# Patient Record
Sex: FEMALE | Race: WHITE | Employment: UNEMPLOYED | URBAN - METROPOLITAN AREA
[De-identification: names, ages, dates, MRNs, and addresses within clinical notes are randomized per-mention and may not be internally consistent; named-entity substitution may affect disease eponyms.]

---

## 2019-06-14 ENCOUNTER — TELEPHONE (OUTPATIENT)
Dept: NEUROLOGY | Age: 44
End: 2019-06-14

## 2019-06-14 NOTE — TELEPHONE ENCOUNTER
MA contacted pt regarding no show appt 6/14 with Sugar Linder PA-C. Pt stated she forgot and wished to reschedule. Pt was rescheduled for 7/18/18 at the [place] office with SHYLA Turner. Pt confirmed date, time, appt location and was advised to bring insurance card, ID, med list and specialist co-pay to the ov. Reminder letter was mailed to patient.    Electronically signed by Vivi Yang on 6/14/19 at 9:47 AM

## 2019-07-19 ENCOUNTER — TELEPHONE (OUTPATIENT)
Dept: NEUROLOGY | Age: 44
End: 2019-07-19

## 2025-04-06 ENCOUNTER — APPOINTMENT (OUTPATIENT)
Dept: CARDIOLOGY | Facility: HOSPITAL | Age: 50
DRG: 062 | End: 2025-04-06
Payer: COMMERCIAL

## 2025-04-06 ENCOUNTER — APPOINTMENT (OUTPATIENT)
Dept: RADIOLOGY | Facility: HOSPITAL | Age: 50
DRG: 062 | End: 2025-04-06
Payer: COMMERCIAL

## 2025-04-06 ENCOUNTER — HOSPITAL ENCOUNTER (INPATIENT)
Facility: HOSPITAL | Age: 50
End: 2025-04-06
Attending: EMERGENCY MEDICINE | Admitting: INTERNAL MEDICINE
Payer: COMMERCIAL

## 2025-04-06 DIAGNOSIS — I63.9 CEREBROVASCULAR ACCIDENT (CVA), UNSPECIFIED MECHANISM (MULTI): Primary | ICD-10-CM

## 2025-04-06 DIAGNOSIS — I67.848 OTHER CEREBROVASCULAR VASOSPASM AND VASOCONSTRICTION: ICD-10-CM

## 2025-04-06 DIAGNOSIS — I67.89 OTHER CEREBROVASCULAR DISEASE: ICD-10-CM

## 2025-04-06 PROBLEM — I10 HYPERTENSION: Status: ACTIVE | Noted: 2025-04-06

## 2025-04-06 PROBLEM — H57.02 ANISOCORIA: Status: ACTIVE | Noted: 2021-12-08

## 2025-04-06 PROBLEM — Z86.69 HX OF MIGRAINE HEADACHES: Status: ACTIVE | Noted: 2025-03-06

## 2025-04-06 PROBLEM — K21.9 GASTROESOPHAGEAL REFLUX DISEASE: Status: ACTIVE | Noted: 2021-12-08

## 2025-04-06 PROBLEM — E11.9 DIABETES MELLITUS (MULTI): Status: ACTIVE | Noted: 2021-12-08

## 2025-04-06 PROBLEM — J44.9 CHRONIC OBSTRUCTIVE PULMONARY DISEASE (MULTI): Status: ACTIVE | Noted: 2021-12-08

## 2025-04-06 PROBLEM — K58.9 IRRITABLE BOWEL SYNDROME: Status: ACTIVE | Noted: 2025-04-06

## 2025-04-06 PROBLEM — G89.4 CHRONIC PAIN SYNDROME: Status: ACTIVE | Noted: 2021-12-08

## 2025-04-06 PROBLEM — F31.9 BIPOLAR DISORDER: Status: ACTIVE | Noted: 2021-12-08

## 2025-04-06 PROBLEM — G25.81 RESTLESS LEGS: Status: ACTIVE | Noted: 2021-12-08

## 2025-04-06 PROBLEM — R56.9 SEIZURE (MULTI): Status: ACTIVE | Noted: 2025-04-06

## 2025-04-06 PROBLEM — B35.1 ONYCHOMYCOSIS: Status: ACTIVE | Noted: 2017-02-22

## 2025-04-06 PROBLEM — Z96.659 HISTORY OF TOTAL KNEE REPLACEMENT: Status: ACTIVE | Noted: 2025-04-06

## 2025-04-06 LAB
ALBUMIN SERPL BCP-MCNC: 3.5 G/DL (ref 3.4–5)
ALP SERPL-CCNC: 85 U/L (ref 33–110)
ALT SERPL W P-5'-P-CCNC: 6 U/L (ref 7–45)
ANION GAP SERPL CALC-SCNC: 14 MMOL/L (ref 10–20)
AST SERPL W P-5'-P-CCNC: 6 U/L (ref 9–39)
BASOPHILS # BLD AUTO: 0.04 X10*3/UL (ref 0–0.1)
BASOPHILS NFR BLD AUTO: 0.4 %
BILIRUB SERPL-MCNC: 0.4 MG/DL (ref 0–1.2)
BUN SERPL-MCNC: 5 MG/DL (ref 6–23)
CALCIUM SERPL-MCNC: 9.2 MG/DL (ref 8.6–10.3)
CARDIAC TROPONIN I PNL SERPL HS: 3 NG/L (ref 0–13)
CHLORIDE SERPL-SCNC: 103 MMOL/L (ref 98–107)
CO2 SERPL-SCNC: 24 MMOL/L (ref 21–32)
CREAT SERPL-MCNC: 0.62 MG/DL (ref 0.5–1.05)
EGFRCR SERPLBLD CKD-EPI 2021: >90 ML/MIN/1.73M*2
EOSINOPHIL # BLD AUTO: 0.18 X10*3/UL (ref 0–0.7)
EOSINOPHIL NFR BLD AUTO: 1.9 %
ERYTHROCYTE [DISTWIDTH] IN BLOOD BY AUTOMATED COUNT: 14.2 % (ref 11.5–14.5)
GLUCOSE BLD MANUAL STRIP-MCNC: 187 MG/DL (ref 74–99)
GLUCOSE BLD MANUAL STRIP-MCNC: 194 MG/DL (ref 74–99)
GLUCOSE BLD MANUAL STRIP-MCNC: 194 MG/DL (ref 74–99)
GLUCOSE SERPL-MCNC: 200 MG/DL (ref 74–99)
HCT VFR BLD AUTO: 44.4 % (ref 36–46)
HGB BLD-MCNC: 14.7 G/DL (ref 12–16)
IMM GRANULOCYTES # BLD AUTO: 0.07 X10*3/UL (ref 0–0.7)
IMM GRANULOCYTES NFR BLD AUTO: 0.7 % (ref 0–0.9)
INR PPP: 1.1 (ref 0.9–1.1)
LYMPHOCYTES # BLD AUTO: 1.34 X10*3/UL (ref 1.2–4.8)
LYMPHOCYTES NFR BLD AUTO: 14.3 %
MAGNESIUM SERPL-MCNC: 1.7 MG/DL (ref 1.6–2.4)
MCH RBC QN AUTO: 27.9 PG (ref 26–34)
MCHC RBC AUTO-ENTMCNC: 33.1 G/DL (ref 32–36)
MCV RBC AUTO: 84 FL (ref 80–100)
MONOCYTES # BLD AUTO: 0.74 X10*3/UL (ref 0.1–1)
MONOCYTES NFR BLD AUTO: 7.9 %
NEUTROPHILS # BLD AUTO: 6.99 X10*3/UL (ref 1.2–7.7)
NEUTROPHILS NFR BLD AUTO: 74.8 %
NRBC BLD-RTO: 0 /100 WBCS (ref 0–0)
PHOSPHATE SERPL-MCNC: 2.8 MG/DL (ref 2.5–4.9)
PLATELET # BLD AUTO: 235 X10*3/UL (ref 150–450)
POTASSIUM SERPL-SCNC: 3 MMOL/L (ref 3.5–5.3)
PROT SERPL-MCNC: 6 G/DL (ref 6.4–8.2)
PROTHROMBIN TIME: 12.4 SECONDS (ref 9.8–12.4)
RBC # BLD AUTO: 5.27 X10*6/UL (ref 4–5.2)
SODIUM SERPL-SCNC: 138 MMOL/L (ref 136–145)
WBC # BLD AUTO: 9.4 X10*3/UL (ref 4.4–11.3)

## 2025-04-06 PROCEDURE — 85025 COMPLETE CBC W/AUTO DIFF WBC: CPT | Performed by: EMERGENCY MEDICINE

## 2025-04-06 PROCEDURE — 2500000004 HC RX 250 GENERAL PHARMACY W/ HCPCS (ALT 636 FOR OP/ED)

## 2025-04-06 PROCEDURE — 96374 THER/PROPH/DIAG INJ IV PUSH: CPT

## 2025-04-06 PROCEDURE — 85610 PROTHROMBIN TIME: CPT | Performed by: EMERGENCY MEDICINE

## 2025-04-06 PROCEDURE — 70450 CT HEAD/BRAIN W/O DYE: CPT

## 2025-04-06 PROCEDURE — 83735 ASSAY OF MAGNESIUM: CPT | Performed by: EMERGENCY MEDICINE

## 2025-04-06 PROCEDURE — 82947 ASSAY GLUCOSE BLOOD QUANT: CPT

## 2025-04-06 PROCEDURE — 36415 COLL VENOUS BLD VENIPUNCTURE: CPT | Performed by: EMERGENCY MEDICINE

## 2025-04-06 PROCEDURE — 93005 ELECTROCARDIOGRAM TRACING: CPT

## 2025-04-06 PROCEDURE — 2500000001 HC RX 250 WO HCPCS SELF ADMINISTERED DRUGS (ALT 637 FOR MEDICARE OP): Performed by: EMERGENCY MEDICINE

## 2025-04-06 PROCEDURE — 84484 ASSAY OF TROPONIN QUANT: CPT | Performed by: EMERGENCY MEDICINE

## 2025-04-06 PROCEDURE — 99291 CRITICAL CARE FIRST HOUR: CPT | Performed by: INTERNAL MEDICINE

## 2025-04-06 PROCEDURE — 2550000001 HC RX 255 CONTRASTS: Performed by: EMERGENCY MEDICINE

## 2025-04-06 PROCEDURE — 80053 COMPREHEN METABOLIC PANEL: CPT | Performed by: EMERGENCY MEDICINE

## 2025-04-06 PROCEDURE — 2500000004 HC RX 250 GENERAL PHARMACY W/ HCPCS (ALT 636 FOR OP/ED): Performed by: EMERGENCY MEDICINE

## 2025-04-06 PROCEDURE — 2020000001 HC ICU ROOM DAILY

## 2025-04-06 PROCEDURE — 70496 CT ANGIOGRAPHY HEAD: CPT

## 2025-04-06 PROCEDURE — 2500000001 HC RX 250 WO HCPCS SELF ADMINISTERED DRUGS (ALT 637 FOR MEDICARE OP)

## 2025-04-06 PROCEDURE — 2500000002 HC RX 250 W HCPCS SELF ADMINISTERED DRUGS (ALT 637 FOR MEDICARE OP, ALT 636 FOR OP/ED): Performed by: INTERNAL MEDICINE

## 2025-04-06 PROCEDURE — G0425 INPT/ED TELECONSULT30: HCPCS | Performed by: STUDENT IN AN ORGANIZED HEALTH CARE EDUCATION/TRAINING PROGRAM

## 2025-04-06 PROCEDURE — 96361 HYDRATE IV INFUSION ADD-ON: CPT

## 2025-04-06 PROCEDURE — 84100 ASSAY OF PHOSPHORUS: CPT | Performed by: EMERGENCY MEDICINE

## 2025-04-06 PROCEDURE — 71045 X-RAY EXAM CHEST 1 VIEW: CPT

## 2025-04-06 PROCEDURE — 70450 CT HEAD/BRAIN W/O DYE: CPT | Performed by: RADIOLOGY

## 2025-04-06 PROCEDURE — 3E03317 INTRODUCTION OF OTHER THROMBOLYTIC INTO PERIPHERAL VEIN, PERCUTANEOUS APPROACH: ICD-10-PCS | Performed by: EMERGENCY MEDICINE

## 2025-04-06 PROCEDURE — 99291 CRITICAL CARE FIRST HOUR: CPT | Performed by: EMERGENCY MEDICINE

## 2025-04-06 PROCEDURE — 70496 CT ANGIOGRAPHY HEAD: CPT | Performed by: RADIOLOGY

## 2025-04-06 PROCEDURE — 70498 CT ANGIOGRAPHY NECK: CPT | Performed by: RADIOLOGY

## 2025-04-06 PROCEDURE — 99223 1ST HOSP IP/OBS HIGH 75: CPT

## 2025-04-06 PROCEDURE — 2500000002 HC RX 250 W HCPCS SELF ADMINISTERED DRUGS (ALT 637 FOR MEDICARE OP, ALT 636 FOR OP/ED)

## 2025-04-06 PROCEDURE — 2500000001 HC RX 250 WO HCPCS SELF ADMINISTERED DRUGS (ALT 637 FOR MEDICARE OP): Performed by: INTERNAL MEDICINE

## 2025-04-06 RX ORDER — PROMETHAZINE HYDROCHLORIDE 25 MG/1
25 TABLET ORAL EVERY 6 HOURS PRN
Status: ON HOLD | COMMUNITY
Start: 2024-09-09

## 2025-04-06 RX ORDER — OXYCODONE HYDROCHLORIDE 5 MG/1
5 TABLET ORAL ONCE
Status: COMPLETED | OUTPATIENT
Start: 2025-04-06 | End: 2025-04-06

## 2025-04-06 RX ORDER — TOPIRAMATE 100 MG/1
100 TABLET, FILM COATED ORAL 2 TIMES DAILY
Status: ON HOLD | COMMUNITY

## 2025-04-06 RX ORDER — GABAPENTIN 800 MG/1
1 TABLET ORAL 3 TIMES DAILY
Status: ON HOLD | COMMUNITY
Start: 2025-03-25

## 2025-04-06 RX ORDER — INSULIN GLARGINE 100 [IU]/ML
60 INJECTION, SOLUTION SUBCUTANEOUS EVERY 24 HOURS
Status: DISCONTINUED | OUTPATIENT
Start: 2025-04-06 | End: 2025-04-06

## 2025-04-06 RX ORDER — POTASSIUM CHLORIDE 1.5 G/1.58G
40 POWDER, FOR SOLUTION ORAL ONCE
Status: DISCONTINUED | OUTPATIENT
Start: 2025-04-06 | End: 2025-04-06

## 2025-04-06 RX ORDER — LURASIDONE HYDROCHLORIDE 80 MG/1
1 TABLET, FILM COATED ORAL NIGHTLY
Status: ON HOLD | COMMUNITY

## 2025-04-06 RX ORDER — GABAPENTIN 400 MG/1
800 CAPSULE ORAL 3 TIMES DAILY
Status: DISPENSED | OUTPATIENT
Start: 2025-04-06

## 2025-04-06 RX ORDER — INSULIN GLARGINE 100 [IU]/ML
60 INJECTION, SOLUTION SUBCUTANEOUS NIGHTLY
Status: ON HOLD | COMMUNITY

## 2025-04-06 RX ORDER — ROPINIROLE 4 MG/1
1 TABLET, FILM COATED ORAL NIGHTLY
Status: ON HOLD | COMMUNITY

## 2025-04-06 RX ORDER — HYDROXYZINE HYDROCHLORIDE 25 MG/1
50 TABLET, FILM COATED ORAL 3 TIMES DAILY
Status: DISPENSED | OUTPATIENT
Start: 2025-04-06

## 2025-04-06 RX ORDER — LABETALOL HYDROCHLORIDE 5 MG/ML
10 INJECTION, SOLUTION INTRAVENOUS EVERY 10 MIN PRN
Status: ACTIVE | OUTPATIENT
Start: 2025-04-06

## 2025-04-06 RX ORDER — LURASIDONE HYDROCHLORIDE 40 MG/1
80 TABLET, FILM COATED ORAL NIGHTLY
Status: DISCONTINUED | OUTPATIENT
Start: 2025-04-06 | End: 2025-04-06

## 2025-04-06 RX ORDER — HYDROXYZINE HYDROCHLORIDE 50 MG/1
1 TABLET, FILM COATED ORAL 3 TIMES DAILY
Status: ON HOLD | COMMUNITY

## 2025-04-06 RX ORDER — INSULIN GLARGINE 100 [IU]/ML
60 INJECTION, SOLUTION SUBCUTANEOUS NIGHTLY
Status: DISCONTINUED | OUTPATIENT
Start: 2025-04-06 | End: 2025-04-06

## 2025-04-06 RX ORDER — DEXTROSE 50 % IN WATER (D50W) INTRAVENOUS SYRINGE
25
Status: ACTIVE | OUTPATIENT
Start: 2025-04-06

## 2025-04-06 RX ORDER — TOPIRAMATE 50 MG/1
100 TABLET, FILM COATED ORAL 2 TIMES DAILY
Status: DISCONTINUED | OUTPATIENT
Start: 2025-04-06 | End: 2025-04-06

## 2025-04-06 RX ORDER — INSULIN LISPRO 100 [IU]/ML
0-10 INJECTION, SOLUTION INTRAVENOUS; SUBCUTANEOUS
Status: DISPENSED | OUTPATIENT
Start: 2025-04-06

## 2025-04-06 RX ORDER — IPRATROPIUM BROMIDE AND ALBUTEROL SULFATE 2.5; .5 MG/3ML; MG/3ML
3 SOLUTION RESPIRATORY (INHALATION) EVERY 4 HOURS PRN
Status: DISPENSED | OUTPATIENT
Start: 2025-04-06

## 2025-04-06 RX ORDER — ALPRAZOLAM 2 MG/1
1 TABLET ORAL 2 TIMES DAILY PRN
Status: ON HOLD | COMMUNITY

## 2025-04-06 RX ORDER — TRAZODONE HYDROCHLORIDE 100 MG/1
100 TABLET ORAL NIGHTLY
Status: ON HOLD | COMMUNITY

## 2025-04-06 RX ORDER — TEMAZEPAM 30 MG/1
30 CAPSULE ORAL NIGHTLY
Status: ON HOLD | COMMUNITY
Start: 2024-07-18

## 2025-04-06 RX ORDER — TOPIRAMATE 50 MG/1
75 TABLET, FILM COATED ORAL 2 TIMES DAILY
Status: DISPENSED | OUTPATIENT
Start: 2025-04-06

## 2025-04-06 RX ORDER — HYDRALAZINE HYDROCHLORIDE 20 MG/ML
10 INJECTION INTRAMUSCULAR; INTRAVENOUS
Status: ACTIVE | OUTPATIENT
Start: 2025-04-06

## 2025-04-06 RX ORDER — TRAZODONE HYDROCHLORIDE 50 MG/1
100 TABLET ORAL NIGHTLY
Status: DISPENSED | OUTPATIENT
Start: 2025-04-06

## 2025-04-06 RX ORDER — FLUTICASONE FUROATE AND VILANTEROL 200; 25 UG/1; UG/1
1 POWDER RESPIRATORY (INHALATION) DAILY
Status: ON HOLD | COMMUNITY

## 2025-04-06 RX ORDER — BUTALBITAL, ACETAMINOPHEN AND CAFFEINE 50; 325; 40 MG/1; MG/1; MG/1
1 TABLET ORAL EVERY 8 HOURS PRN
Status: DISPENSED | OUTPATIENT
Start: 2025-04-06 | End: 2025-04-08

## 2025-04-06 RX ORDER — ROPINIROLE 1 MG/1
4 TABLET, FILM COATED ORAL NIGHTLY
Status: DISPENSED | OUTPATIENT
Start: 2025-04-06

## 2025-04-06 RX ORDER — ATORVASTATIN CALCIUM 40 MG/1
40 TABLET, FILM COATED ORAL NIGHTLY
Status: DISPENSED | OUTPATIENT
Start: 2025-04-06

## 2025-04-06 RX ORDER — ALPRAZOLAM 0.5 MG/1
2 TABLET ORAL 2 TIMES DAILY PRN
Status: DISPENSED | OUTPATIENT
Start: 2025-04-06

## 2025-04-06 RX ORDER — ALBUTEROL SULFATE 90 UG/1
2 INHALANT RESPIRATORY (INHALATION) EVERY 4 HOURS PRN
Status: DISCONTINUED | OUTPATIENT
Start: 2025-04-06 | End: 2025-04-06

## 2025-04-06 RX ORDER — DEXTROSE 50 % IN WATER (D50W) INTRAVENOUS SYRINGE
12.5
Status: ACTIVE | OUTPATIENT
Start: 2025-04-06

## 2025-04-06 RX ORDER — FLUTICASONE FUROATE AND VILANTEROL 200; 25 UG/1; UG/1
1 POWDER RESPIRATORY (INHALATION) DAILY
Status: DISPENSED | OUTPATIENT
Start: 2025-04-06

## 2025-04-06 RX ORDER — ALBUTEROL SULFATE 90 UG/1
2 INHALANT RESPIRATORY (INHALATION) EVERY 4 HOURS PRN
Status: ON HOLD | COMMUNITY

## 2025-04-06 RX ORDER — POTASSIUM CHLORIDE 20 MEQ/1
40 TABLET, EXTENDED RELEASE ORAL ONCE
Status: COMPLETED | OUTPATIENT
Start: 2025-04-06 | End: 2025-04-06

## 2025-04-06 RX ADMIN — GABAPENTIN 800 MG: 400 CAPSULE ORAL at 17:24

## 2025-04-06 RX ADMIN — GABAPENTIN 800 MG: 400 CAPSULE ORAL at 21:07

## 2025-04-06 RX ADMIN — INSULIN LISPRO 2 UNITS: 100 INJECTION, SOLUTION INTRAVENOUS; SUBCUTANEOUS at 17:44

## 2025-04-06 RX ADMIN — ALPRAZOLAM 2 MG: 0.5 TABLET ORAL at 21:12

## 2025-04-06 RX ADMIN — Medication 25 MG: at 12:00

## 2025-04-06 RX ADMIN — HYDROXYZINE HYDROCHLORIDE 50 MG: 25 TABLET, FILM COATED ORAL at 17:24

## 2025-04-06 RX ADMIN — BUTALBITAL, ACETAMINOPHEN, AND CAFFEINE 1 TABLET: 325; 50; 40 TABLET ORAL at 17:23

## 2025-04-06 RX ADMIN — INSULIN LISPRO 2 UNITS: 100 INJECTION, SOLUTION INTRAVENOUS; SUBCUTANEOUS at 21:02

## 2025-04-06 RX ADMIN — ROPINIROLE HYDROCHLORIDE 4 MG: 1 TABLET, FILM COATED ORAL at 21:07

## 2025-04-06 RX ADMIN — IPRATROPIUM BROMIDE AND ALBUTEROL SULFATE 3 ML: 2.5; .5 SOLUTION RESPIRATORY (INHALATION) at 17:24

## 2025-04-06 RX ADMIN — TRAZODONE HYDROCHLORIDE 100 MG: 50 TABLET ORAL at 21:05

## 2025-04-06 RX ADMIN — POTASSIUM CHLORIDE 40 MEQ: 1500 TABLET, EXTENDED RELEASE ORAL at 17:24

## 2025-04-06 RX ADMIN — ATORVASTATIN CALCIUM 40 MG: 40 TABLET, FILM COATED ORAL at 21:07

## 2025-04-06 RX ADMIN — OXYCODONE HYDROCHLORIDE 5 MG: 5 TABLET ORAL at 13:01

## 2025-04-06 RX ADMIN — TOPIRAMATE 75 MG: 50 TABLET, FILM COATED ORAL at 21:07

## 2025-04-06 RX ADMIN — SODIUM CHLORIDE 1000 ML: 9 INJECTION, SOLUTION INTRAVENOUS at 12:21

## 2025-04-06 RX ADMIN — IOHEXOL 90 ML: 350 INJECTION, SOLUTION INTRAVENOUS at 11:42

## 2025-04-06 RX ADMIN — HYDROXYZINE HYDROCHLORIDE 50 MG: 25 TABLET, FILM COATED ORAL at 21:06

## 2025-04-06 ASSESSMENT — LIFESTYLE VARIABLES
EVER HAD A DRINK FIRST THING IN THE MORNING TO STEADY YOUR NERVES TO GET RID OF A HANGOVER: NO
EVER FELT BAD OR GUILTY ABOUT YOUR DRINKING: NO
HAVE YOU EVER FELT YOU SHOULD CUT DOWN ON YOUR DRINKING: NO
HAVE PEOPLE ANNOYED YOU BY CRITICIZING YOUR DRINKING: NO
TOTAL SCORE: 0

## 2025-04-06 ASSESSMENT — COGNITIVE AND FUNCTIONAL STATUS - GENERAL
HELP NEEDED FOR BATHING: A LITTLE
DAILY ACTIVITIY SCORE: 20
DRESSING REGULAR UPPER BODY CLOTHING: A LITTLE
CLIMB 3 TO 5 STEPS WITH RAILING: A LOT
TOILETING: A LITTLE
MOBILITY SCORE: 14
DRESSING REGULAR LOWER BODY CLOTHING: A LITTLE
WALKING IN HOSPITAL ROOM: A LOT
MOVING TO AND FROM BED TO CHAIR: A LOT
TURNING FROM BACK TO SIDE WHILE IN FLAT BAD: A LITTLE
MOVING FROM LYING ON BACK TO SITTING ON SIDE OF FLAT BED WITH BEDRAILS: A LITTLE
STANDING UP FROM CHAIR USING ARMS: A LOT

## 2025-04-06 ASSESSMENT — ENCOUNTER SYMPTOMS
ACTIVITY CHANGE: 1
NAUSEA: 0
NUMBNESS: 1
WOUND: 0
CHILLS: 0
HEMATURIA: 0
JOINT SWELLING: 0
COUGH: 0
CHEST TIGHTNESS: 0
SHORTNESS OF BREATH: 0
FLANK PAIN: 0
BACK PAIN: 0
FACIAL ASYMMETRY: 1
HEADACHES: 1
FATIGUE: 0
WEAKNESS: 1
CONSTIPATION: 0
PALPITATIONS: 0
FEVER: 0
WHEEZING: 0
VOMITING: 0
DIARRHEA: 0
TREMORS: 0
ABDOMINAL PAIN: 0

## 2025-04-06 ASSESSMENT — PAIN - FUNCTIONAL ASSESSMENT
PAIN_FUNCTIONAL_ASSESSMENT: 0-10

## 2025-04-06 ASSESSMENT — PAIN DESCRIPTION - LOCATION: LOCATION: HEAD

## 2025-04-06 ASSESSMENT — COLUMBIA-SUICIDE SEVERITY RATING SCALE - C-SSRS
2. HAVE YOU ACTUALLY HAD ANY THOUGHTS OF KILLING YOURSELF?: NO
1. IN THE PAST MONTH, HAVE YOU WISHED YOU WERE DEAD OR WISHED YOU COULD GO TO SLEEP AND NOT WAKE UP?: NO
6. HAVE YOU EVER DONE ANYTHING, STARTED TO DO ANYTHING, OR PREPARED TO DO ANYTHING TO END YOUR LIFE?: NO

## 2025-04-06 ASSESSMENT — PAIN SCALES - GENERAL
PAINLEVEL_OUTOF10: 8
PAINLEVEL_OUTOF10: 10 - WORST POSSIBLE PAIN
PAINLEVEL_OUTOF10: 0 - NO PAIN
PAINLEVEL_OUTOF10: 8
PAINLEVEL_OUTOF10: 8

## 2025-04-06 ASSESSMENT — PAIN DESCRIPTION - DESCRIPTORS: DESCRIPTORS: STABBING;SQUEEZING

## 2025-04-06 ASSESSMENT — PAIN DESCRIPTION - PAIN TYPE: TYPE: ACUTE PAIN

## 2025-04-06 NOTE — CONSULTS
"Consults  Virtual Visit start time: 12:00    History Of Present Illness:  Historian: Patient and ED Provider   Maribeth Nam is a 49 y.o. female presenting with L facial droop and R sided weakness.  Last known well: 11am   Had stroke symptoms resolved at time of presentation: No   Current antiplatelet/anticoagulant use: None    Pt with hx of reported grand-mal seizures on topiramate, anxiety.     Pt states she was at [employer] when she noticed her L face became droopy. EMS called. No vision / hearing changes. Reports head pressure. Her R side feels weak but she notes it's more of her R knee discomfort, chronic, since her R knee surgery >1 year ago with tingling sensation. Came to ED for eval.    In the ED, /97, initial NIHSS by ED provider 9. Received TNK for the sx already. On virtual eval 4 which was done shortly after TNK. Pt subjectively reports she does not feel sx improving since TNK (perhaps worsening).     Prior Functional Status (Modified [place] Scale):  0 The patient has no residual symptoms.    Stroke Risk Factors:  Diabetes Mellitus and Sleep Apnea    Last Recorded Vitals:  Blood pressure 115/76, pulse 80, temperature 36.8 °C (98.3 °F), temperature source Temporal, resp. rate 18, height 1.626 m (5' 4\"), weight 119 kg (262 lb 5.6 oz), SpO2 94%.    NIHSS   NIHSS    1a  Level of consciousness: 0=alert; keenly responsive   1b. LOC questions:  0=Performs both tasks correctly   1c. LOC commands: 0=Performs both tasks correctly   2.  Best Gaze: 0=normal   3. Visual: 0=No visual loss   4. Facial Palsy: 2=Partial paralysis (total or near total paralysis of the lower face)   5a. Motor Left Arm: 0=No drift, limb holds 90 (or 45) degrees for full 10 seconds   5b.  Motor Right Arm: 1=Drift, limb holds 90 (or 45) degrees but drifts down before full 10 seconds: does not hit bed   6a. Motor Left Le=No drift, limb holds 90 (or 45) degrees for full 10 seconds   6b  Motor Right Le=Drift, limb holds 90 (or " 45) degrees but drifts down before full 10 seconds: does not hit bed   7. Limb Ataxia: 0=Absent   8.  Sensory: 0=Normal; no sensory loss   9. Best Language:  0=No aphasia, normal   10. Dysarthria: 0=Normal   11. Extinction and Inattention: 0=No abnormality          Total:   4     No truncal ataxia. She was able to sit up on the edge of bed without much support. She did report lightheadedness.        Relevant Results:  LABS:  Glucose   Date Value Ref Range Status   04/06/2025 200 (H) 74 - 99 mg/dL Final     INR   Date Value Ref Range Status   04/06/2025 1.1 0.9 - 1.1 Final      Results for orders placed or performed during the hospital encounter of 04/06/25 (from the past 24 hours)   POCT GLUCOSE   Result Value Ref Range    POCT Glucose 194 (H) 74 - 99 mg/dL   Troponin I, High Sensitivity   Result Value Ref Range    Troponin I, High Sensitivity 3 0 - 13 ng/L   Comprehensive metabolic panel   Result Value Ref Range    Glucose 200 (H) 74 - 99 mg/dL    Sodium 138 136 - 145 mmol/L    Potassium 3.0 (L) 3.5 - 5.3 mmol/L    Chloride 103 98 - 107 mmol/L    Bicarbonate 24 21 - 32 mmol/L    Anion Gap 14 10 - 20 mmol/L    Urea Nitrogen 5 (L) 6 - 23 mg/dL    Creatinine 0.62 0.50 - 1.05 mg/dL    eGFR >90 >60 mL/min/1.73m*2    Calcium 9.2 8.6 - 10.3 mg/dL    Albumin 3.5 3.4 - 5.0 g/dL    Alkaline Phosphatase 85 33 - 110 U/L    Total Protein 6.0 (L) 6.4 - 8.2 g/dL    AST 6 (L) 9 - 39 U/L    Bilirubin, Total 0.4 0.0 - 1.2 mg/dL    ALT 6 (L) 7 - 45 U/L   CBC and Auto Differential   Result Value Ref Range    WBC 9.4 4.4 - 11.3 x10*3/uL    nRBC 0.0 0.0 - 0.0 /100 WBCs    RBC 5.27 (H) 4.00 - 5.20 x10*6/uL    Hemoglobin 14.7 12.0 - 16.0 g/dL    Hematocrit 44.4 36.0 - 46.0 %    MCV 84 80 - 100 fL    MCH 27.9 26.0 - 34.0 pg    MCHC 33.1 32.0 - 36.0 g/dL    RDW 14.2 11.5 - 14.5 %    Platelets 235 150 - 450 x10*3/uL    Neutrophils % 74.8 40.0 - 80.0 %    Immature Granulocytes %, Automated 0.7 0.0 - 0.9 %    Lymphocytes % 14.3 13.0 - 44.0 %     Monocytes % 7.9 2.0 - 10.0 %    Eosinophils % 1.9 0.0 - 6.0 %    Basophils % 0.4 0.0 - 2.0 %    Neutrophils Absolute 6.99 1.20 - 7.70 x10*3/uL    Immature Granulocytes Absolute, Automated 0.07 0.00 - 0.70 x10*3/uL    Lymphocytes Absolute 1.34 1.20 - 4.80 x10*3/uL    Monocytes Absolute 0.74 0.10 - 1.00 x10*3/uL    Eosinophils Absolute 0.18 0.00 - 0.70 x10*3/uL    Basophils Absolute 0.04 0.00 - 0.10 x10*3/uL   Magnesium   Result Value Ref Range    Magnesium 1.70 1.60 - 2.40 mg/dL   Protime-INR   Result Value Ref Range    Protime 12.4 9.8 - 12.4 seconds    INR 1.1 0.9 - 1.1   Phosphorus   Result Value Ref Range    Phosphorus 2.8 2.5 - 4.9 mg/dL        CT Head Imaging:  CTH imaging personally reviewed, showed no acute ischemic / hemorrhagic changes     CTA Head and Neck Imaging:  CTA head and neck imaging personally reviewed, no large vessel occlusion or severe stenosis seen    CT Perfusion Imaging:  N/a     Assessment:  Supect Acute Ischemic Stroke    IV Thrombolysis IV Thrombolysis Checklist        IV Thrombolysis Given: Yes Thrombolysis Administration: administration time 1200pm. Patient meets inclusion and exclusion criteria with expected benefits exceeding the risks of IV thrombolysis therapy or withholding therapy. Patient/ family understand potential risks & benefits and consent to IV Thrombolysis. Discussed the diagnosis of suspected ischemic stroke and options for treatment, including alternative treatments. For patients meeting inclusion and exclusion criteria, the expected benefits exceed the risks of IV thrombolysis therapy or withholding therapy. The main risk of IV thrombolysis therapy is bleeding into the brain or body that may require blood transfusions or lead to death. In clinical studies, the rate of death was not higher in patients who received IV thrombolysis compared to those who did not. Other risks include allergic reactions, and with any procedure there is always the possibility of an  unexpected complication.  Patient is <18 - refer  [facility] for Emergency Management of pediatric patients with Acute suspected Stroke & the Pediatric IV Thrombolysis Consent. Consent is completed on a paper document and if criteria is met/ benefit outweigh the risk the thrombolytic Alteplase should be given.  :99}Were there delays to thrombolysis administration?: no          Patient is a candidate for thrombectomy:  yes/no: No; contraindication reason: No evidence of proximal occlusion    Additional Recommendations:  New facial droop. R weakness may have improved though subjectively pt feels worse.   Recommend stroke eval.    MRI Brain w/o contrast stroke protocol or repeat CTH 24 hours after initial CTH if unable to get MRI.  CTA head and neck or MRA head and neck to evaluate for cerebral vasculature   EKG, troponin.  TTE w/ bubble study.  Please obtain extended cardiac rhythm monitoring with Holter to rule out paroxysmal A fib.  Lipid panel, A1c.  Utox.  Hold off all antiplatelets and anticoagulant medications for 24 hrs after thrombolysis. Repeat CT Head 24 hrs after TNK administration. If CTH negative for hemorrhagic conversion, consider starting Aspirin 81 mg.  Hold apt 24h, aspirin after   Lipid Goals: education on healthy diet and statin therapy to maintain or achieve goal LDL-cholesterol < 70mg. Atorvastatin 40mg..  Blood pressure goals: avoid hypotension SBP <100 that could worsen cerebral perfusion. Ischemic stroke post-thrombolysis- BP < 180/105 mmHg for 24hr..  Glucose Goals: early treatment of hyperglycemia to goal glucose 140-180 mg/dl with long-term goal A1c < 7%.  NPO until nurse bedside swallow assessment.  Consider focused stroke order set.  Smoking Cessation and Education.  Assessment for Rehabilitation needs.  Patient and family education on signs and symptoms of stroke, calling 911, healthy strategies for stroke prevention.        Disposition:  Patient will remain at referring facility for further  evaluation and management.    Virtual or Telephone Consent    An interactive audio and video telecommunication system which permits real time communications between the patient (at the originating site) and provider (at the distant site) was utilized to provide this telehealth service.   Verbal consent was requested and obtained from Maribeth Nam on this date, 04/06/25 for a telehealth visit.      Total time spent: 30min     Telestroke is covered in shift work. If there are further Neurological questions or concerns please contact your regional neurologist on call during daytime hours or contact the transfer center with an ADT20 order.    Jazmyn Burks MD

## 2025-04-06 NOTE — ED TRIAGE NOTES
"Pt arrived by squad from [facility]. Pt experienced facial drooping, light headedness, and \"feeling funny\" around 1100. Pt also exhibited slurred speech upon arrival. Bgl 194  "

## 2025-04-06 NOTE — CONSULTS
"History Of Present Illness  Maribeth Nam is a 49 y.o. female right handed admitted to [facility] on 4/6/25 presenting with L face numb/weakness and RLE numbness. Neurology consulted for above.    Last known well: 11am  Had stroke symptoms resolved at time of presentation: No    Inpatient consult to Neurology  Consult performed by: Iraj Zimmerman MD  Consult ordered by: Isidro Armendariz PA-C      Listed history of COPD, diabetes (not anymore), migraines, epilepsy, RLS, obesity.    Patient seen this afternoon in the ER, no family members around.  Patient able to give her own history.  Patient lives with son and boyfriend.    Patient states she was in her usual state of health.  She was at [employer] this morning, and while at service about 11 AM, she noted left face paresthesias, and left facial drooping.  Shortly after, she noted numbness in right leg and right thigh.  She tells me she also has tingling in LLE that is new today.  Also some numbness in the right face \"but that is of a minor concern\".  EMS was called, and she was brought to [facility] ED for evaluation.  Initial vital signs documented blood pressure 137/97, heart rate 88, afebrile.  In the ER, she reported left facial droop, difficulty speaking, and weakness in the right upper and lower extremities.  She reported TIAs in the past.  NIH stroke scale score was noted to be 9.  Head CT without contrast done did not show any acute findings.  CTA brain/neck done did not report any hemodynamically significant stenosis or occlusion.  Stroke neurology was consulted, IV TNK was already given (1200h) by the time patient was seen.  Patient admitted to the hospital, neurology consulted.    She states she has had a febrile illness with temperature up to 104 Fahrenheit about 4 days prior to admission.  She did not get checked out.  She was taking over-the-counter medications.  Her temperature limited by the third day.    When I saw patient, she states she still has the same " "symptoms.  Nurse actually told me she was complaining of worsening headache, and was on the way to getting repeat head CT done stat ordered by the ER physician.    On further questioning, patient also has history of epilepsy.  She states this started at age 14, and has been happening on and off over the years, last one was sometime in 2024.  She is on topiramate 50 mg twice daily for this right now.  She tells me she had been on phenobarbital in the past, Depakote (stopped because of weight gain), and Dilantin (toxicity), ? Keppra (? Weight gain).  Patient states her previous neurologist had her on as high as \"topiramate 600 mg in the morning and 800 mg in the evening\" in the past.    She also states she has had \"migraines\".  Starts bifrontal, goes to the back of the head, \"more than throbbing pain\".  She has light sensitivity with it, nausea/vomiting.  She reports being on Nurtec started recently (did not help).  She is on topiramate for this.    She also tells me she has bilateral hand shaking, worse in the last 2 to 3 years.  This has been going on since shortly after she started having seizures (teenagers).  She tells me her previous neurologist have not given her any diagnosis for this and have not tried anything.    She has allergy to aspirin (hives).  She is not on clopidogrel.  She is on topiramate 50 mg twice daily, having just been restarted recently in the last 2 months or so.    Of note, patient has had at least 2 other hospitalizations it appears at other [facility] since January 2025.  She tells me that \"the local doctors are idiots\".  She tells me that [facility], and has only been there a few weeks, she is unable to tell me where the [facility] is, but presumably it is around here, and so she was brought here instead.  It appears she has not been to this facility in the recent past, nor any  facility.  Patient reports 1 time her symptoms were " "attributed to migraine, and 1 time it was attributed to seizures.  She was not too happy with those diagnoses.    Patient states she has had her eyes checked recently--no problems inside the eye to contribute to headaches.    She smokes about 1 pack a day.  She tells me that she was smoking up to 7 packs a day in 2016 when her boyfriend .  She denies alcohol drinking.  She denies street drug use.    On review of [facility] records:    Prior admission elsewhere 2025 to 2025 for acute left-sided weakness, associated with right-sided headache, tingling left face arm and thigh as well as weakness of the left arm and leg, with flashing lights in the peripheral bilaterally similar to previous migraines.  Found to be hypertensive in the ER with /80.  NIH 7.  Head CT, CTA with no abnormal findings.  Echo with normal EF, no PFO.  MRI brain done 2025 did not show acute intracranial findings.  Suspicious for intracranial hypertension, she was supposed to have been on topiramate for migraine and seizure which she was not taking.  It appears that symptoms were attributed to likely migraine with aura with some functional overlay as well.  Topiramate was restarted up to 50 mg twice daily, and Neurontin 800 mg 3 times daily was continued.  Aspirin and Plavix were recommended, however patient has listed allergy to aspirin, and so only clopidogrel was started.  She noted left leg weakness as well.  Testing was done, neurology consulted, suggested there could be \"FND component\".  3/5/2025 head CT without contrast reported no acute intracranial pathology.  3/5/2025 CTA head/neck done reported no stenosis or occlusion in the head or neck.    Prior admission elsewhere 3/5/25 to 3/6/2025 due to left face/arm/leg tingling that began while she was trying to fall asleep.    Review of Systems   Constitutional:  Negative for appetite change, chills and fever.   HENT:  Negative for ear pain and nosebleeds.    Eyes:  " Negative for discharge and itching.   Respiratory:  Negative for choking and chest tightness.    Cardiovascular:  Negative for chest pain and palpitations.   Gastrointestinal:  Negative for abdominal distention, abdominal pain and nausea.   Endocrine: Negative for cold intolerance and heat intolerance.   Genitourinary:  Negative for difficulty urinating and dysuria.   Musculoskeletal:  Negative for gait problem and myalgias.   Neurological:  Negative for dizziness.    Past Medical History  Past Medical History:   Diagnosis Date    Anxiety disorder, unspecified     Anxiety    Chronic obstructive pulmonary disease, unspecified     Chronic obstructive pulmonary disease    Obstructive sleep apnea (adult) (pediatric)     Obstructive sleep apnea    Other conditions influencing health status     Eye Trauma To The Left Eye    Personal history of other diseases of the musculoskeletal system and connective tissue     History of low back pain    Personal history of other diseases of the nervous system and sense organs     History of migraine headaches    Personal history of other diseases of the respiratory system     Personal history of asthma       Surgical History  Past Surgical History:   Procedure Laterality Date    CHOLECYSTECTOMY  07/15/2013    Cholecystectomy    HERNIA REPAIR  07/15/2013    Hernia Repair    HYSTERECTOMY  07/15/2013    Hysterectomy    OTHER SURGICAL HISTORY  07/15/2013    Foot Surgery Left       Social History  Social History     Tobacco Use    Smoking status: Every Day     Current packs/day: 1.00     Types: Cigarettes       Home Meds  (Not in a hospital admission)      Allergies  Doxycycline, Fish containing products, Ibuprofen, Meperidine, Sulfa (sulfonamide antibiotics), Aspirin, and Phenothiazines    Current Meds  Scheduled medications  atorvastatin, 40 mg, oral, Nightly  fluticasone furoate-vilanteroL, 1 puff, inhalation, Daily  gabapentin, 800 mg, oral, TID  hydrOXYzine HCL, 50 mg, oral,  "TID  insulin lispro, 0-10 Units, subcutaneous, Before meals & nightly  perflutren protein A microsphere, 0.5 mL, intravenous, Once in imaging  potassium chloride, 40 mEq, oral, Once  rOPINIRole, 4 mg, oral, Nightly  topiramate, 75 mg, oral, BID  traZODone, 100 mg, oral, Nightly      Continuous medications     PRN medications  PRN medications: albuterol, ALPRAZolam, dextrose, dextrose, glucagon, glucagon, hydrALAZINE, labetaloL    Last Recorded Vitals  Blood pressure 128/77, pulse 82, temperature 36.8 °C (98.3 °F), temperature source Temporal, resp. rate 14, height 1.626 m (5' 4\"), weight 119 kg (262 lb 5.6 oz), SpO2 96%.    Awake, alert, oriented x3, in no distress  Well-nourished, in bed  No leg edema    Patient was seen trying to scoot herself up the bed independently    Mental status exam as above, conversant   Fair fund of knowledge  Recent/remote memory fair  Fair attention span  Pupils round reactive to light, 3-4 mm on R, irregular large pupil on L (per pt post-traumatic--has baseline poor vision on L eye from prior trauma at 4yo), (-) RAPD   Fundoscopic examination was attempted but fundus was not visualized bilaterally   Full EOMs intact, no nystagmus, no ptosis   V1 to V3 sensation is intact, except dec V1 vib sensation on R (non-physiologic)  No facial droop   Hearing grossly intact   No dysarthria  Good shoulder shrug bilaterally   Tongue is midline     (+) action tremor B hand, coarse    Motor strength is at least antigravity on all extremities with some ? Give-way weakness on R shoulder abduction, tone/bulk normal   Reflexes trace on all 4 extremities except R knee not checked (per pt post-op 1 year, deferred testing), downgoing toes bilaterally   Sensation is intact to light touch, vibration on all 4 extremities otherwise  Finger to nose test intact bilaterally   I did not have her stand or walk    Supple neck           NIH Stroke Scale  1A. Level of Consciousness: Alert, Keenly Responsive  1B. Ask " Month and Age: Both Questions Right  1C. Blink Eyes & Squeeze Hands: Performs Both Tasks  2. Best Gaze: Normal  3. Visual: No Visual Loss  4. Facial Palsy: Minor Paralysis  5A. Motor - Left Arm: No Drift  5B. Motor - Right Arm: Drift  6A. Motor - Left Leg: No Drift  6B. Motor - Right Leg: Some Effort Against Gravity  7. Limb Ataxia: Present in One Limb  8. Sensory Loss: Mild-to-Moderate Sensory Loss  9. Best Language: No Aphasia  10. Dysarthria: Mild-to-Moderate Dysarthria  11. Extinction and Inattention: No Abnormality  NIH Stroke Scale: 7    Relevant Results  I have personally reviewed the following:    Labs  Results for orders placed or performed during the hospital encounter of 04/06/25 (from the past 24 hours)   POCT GLUCOSE   Result Value Ref Range    POCT Glucose 194 (H) 74 - 99 mg/dL   Troponin I, High Sensitivity   Result Value Ref Range    Troponin I, High Sensitivity 3 0 - 13 ng/L   Comprehensive metabolic panel   Result Value Ref Range    Glucose 200 (H) 74 - 99 mg/dL    Sodium 138 136 - 145 mmol/L    Potassium 3.0 (L) 3.5 - 5.3 mmol/L    Chloride 103 98 - 107 mmol/L    Bicarbonate 24 21 - 32 mmol/L    Anion Gap 14 10 - 20 mmol/L    Urea Nitrogen 5 (L) 6 - 23 mg/dL    Creatinine 0.62 0.50 - 1.05 mg/dL    eGFR >90 >60 mL/min/1.73m*2    Calcium 9.2 8.6 - 10.3 mg/dL    Albumin 3.5 3.4 - 5.0 g/dL    Alkaline Phosphatase 85 33 - 110 U/L    Total Protein 6.0 (L) 6.4 - 8.2 g/dL    AST 6 (L) 9 - 39 U/L    Bilirubin, Total 0.4 0.0 - 1.2 mg/dL    ALT 6 (L) 7 - 45 U/L   CBC and Auto Differential   Result Value Ref Range    WBC 9.4 4.4 - 11.3 x10*3/uL    nRBC 0.0 0.0 - 0.0 /100 WBCs    RBC 5.27 (H) 4.00 - 5.20 x10*6/uL    Hemoglobin 14.7 12.0 - 16.0 g/dL    Hematocrit 44.4 36.0 - 46.0 %    MCV 84 80 - 100 fL    MCH 27.9 26.0 - 34.0 pg    MCHC 33.1 32.0 - 36.0 g/dL    RDW 14.2 11.5 - 14.5 %    Platelets 235 150 - 450 x10*3/uL    Neutrophils % 74.8 40.0 - 80.0 %    Immature Granulocytes %, Automated 0.7 0.0 - 0.9 %     Lymphocytes % 14.3 13.0 - 44.0 %    Monocytes % 7.9 2.0 - 10.0 %    Eosinophils % 1.9 0.0 - 6.0 %    Basophils % 0.4 0.0 - 2.0 %    Neutrophils Absolute 6.99 1.20 - 7.70 x10*3/uL    Immature Granulocytes Absolute, Automated 0.07 0.00 - 0.70 x10*3/uL    Lymphocytes Absolute 1.34 1.20 - 4.80 x10*3/uL    Monocytes Absolute 0.74 0.10 - 1.00 x10*3/uL    Eosinophils Absolute 0.18 0.00 - 0.70 x10*3/uL    Basophils Absolute 0.04 0.00 - 0.10 x10*3/uL   Magnesium   Result Value Ref Range    Magnesium 1.70 1.60 - 2.40 mg/dL   Protime-INR   Result Value Ref Range    Protime 12.4 9.8 - 12.4 seconds    INR 1.1 0.9 - 1.1   Phosphorus   Result Value Ref Range    Phosphorus 2.8 2.5 - 4.9 mg/dL       Imaging results  MR brain wo IV contrast    Result Date: 1/30/2025  * * *Final Report* * * DATE OF EXAM: Jan 30 2025 12:15PM   [facility]   [id number]  -  MRI BRAIN WO IVCON  / ACCESSION #  [id number] PROCEDURE REASON: Neuro deficit, acute, stroke suspected      * * * * Physician Interpretation * * * *  EXAMINATION: MRI BRAIN WO IVCON CLINICAL HISTORY: Left-sided numbness, slurred speech, and facial droop starting yesterday. TECHNIQUE: Routine noncontrast MRI protocol including diffusion images. MQ: MRBWO_2 COMPARISON: CT brain 01/29/2025 RESULT: Acute Change: There is no evidence of restricted diffusion to suggest an acute infarct. Hemorrhage: No evidence of prior parenchymal hemorrhage on the susceptibility weighted images. Mass Lesion/ Mass Effect: No evidence of an intracranial mass or extra-axial fluid collection.  No significant mass effect. Chronic Change: The white matter is within normal limits of signal intensity for age. Parenchyma: No significant volume loss for age.  The brain parenchyma is otherwise within normal limits of signal intensity and morphology. Ventricles: Normal caliber and morphology. Skull Base: Flattening of the pituitary.  Hypothalamic region is unremarkable.   Craniocervical junction is normal. No significant marrow  replacement process. Vasculature: Major intracranial arterial structures, and dural venous sinuses show typical flow void, suggesting patency by spin echo criteria. Other: The visualized paranasal sinuses and mastoid air cells are clear.   Engorgement of the bilateral optic nerve sheaths.  The extracranial soft tissues are unremarkable. Impression: No acute intracranial findings. Constellation of findings suspicious for intracranial hypertension, including flattening of the pituitary and engorgement of the bilateral optic nerve sheaths. : PSCB   Transcribe Date/Time: Jan 30 2025 12:16P Dictated by : BRYCE KENNEDY MD This examination was interpreted and the report reviewed and electronically signed by: BRYCE KENNEDY MD on Jan 30 2025 12:23PM  EST    CT head wo IV contrast    Result Date: 4/6/2025  Interpreted By:  Beti Van, STUDY: CT HEAD WO IV CONTRAST;  4/6/2025 2:45 pm   INDICATION: Signs/Symptoms:headache after TPA.     COMPARISON: 04/06/2025 at 11:34 a.m.   ACCESSION NUMBER(S): [id number]   ORDERING CLINICIAN: MARTY LEJEUNE   TECHNIQUE: Noncontrast axial CT scan of head was performed. Angled reformats in brain and bone windows and coronal and sagittal reformats in brain window were generated.   FINDINGS: CSF Spaces: The ventricles, sulci and basal cisterns are within normal limits. There is no extraaxial fluid collection.   Parenchyma:  The grey-white differentiation is intact. There is no mass effect or midline shift.  There is no intracranial hemorrhage.   Calvarium: The calvarium is unremarkable.   Paranasal sinuses and mastoids: Retention cyst is noted upper portion of the left maxillary sinus. Patchy mucoperiosteal thickening is present in ethmoid air cells bilaterally. Mastoid air cells appear clear.       No evidence of acute cortical infarct or intracranial hemorrhage.   No evidence of intracranial hemorrhage or displaced skull fracture.   MACRO: None.   Signed by: Beti  Pretorius 4/6/2025 3:14 PM Dictation workstation:   [id number]    CT brain attack head wo IV contrast    Result Date: 4/6/2025  Interpreted By:  Juliano Hamilton, STUDY: CT BRAIN ATTACK HEAD WO IV CONTRAST;  4/6/2025 11:35 am   INDICATION: Signs/Symptoms:cva.     COMPARISON: None.   ACCESSION NUMBER(S): [id number]   ORDERING CLINICIAN: MARTY LEJEUNE   TECHNIQUE: Noncontrast axial CT scan of head was performed. Angled reformats in brain and bone windows were generated. The images were reviewed in bone, brain, blood and soft tissue windows.   FINDINGS: CSF Spaces: The ventricles, sulci and basal cisterns are within normal limits. There is no extraaxial fluid collection. Partially empty sella appearance   Parenchyma:  The grey-white differentiation is intact. There is no mass effect or midline shift.  There is no intracranial hemorrhage.   Calvarium: The calvarium is unremarkable.   Paranasal sinuses and mastoids: Minimal mucosal thickening paranasal sinuses.       No evidence of acute cortical infarct or intracranial hemorrhage.   Minimal mucosal thickening paranasal sinuses.   MACRO: Juliano Hamilton discussed the significance and urgency of this critical finding by secure chat with  MARTY LEJEUNE on 4/6/2025 at 11:40 am.  (**-RCF-**) Findings:  See findings.     Signed by: Juliano Hamilton 4/6/2025 11:40 AM Dictation workstation:   [id number]    CT head wo IV contrast    Result Date: 3/5/2025  * * *Final Report* * * DATE OF EXAM: Mar  5 2025  2:45PM   [facility]   0502  -  CT BRAIN ATTACK WO IVCON  / ACCESSION #  [id number] PROCEDURE REASON: Focal neuro deficit, new, fixed, or worsening, < 4.5 hours, stroke suspected      * * * * Physician Interpretation * * * *  EXAMINATION: CT BRAIN ATTACK WO IVCON CLINICAL HISTORY: Right facial droop, left-sided weakness. Brain attack. TECHNIQUE: Routine CT of the brain without IV contrast.  Image quality is somewhat degraded by motion MQ: CTBA_4 CT Radiation dose: Integrated CT  Dose-Length Product (DLP) for this visit =  1122.41 mGy*cm CT Dose Reduction Employed: Iterative recon COMPARISON: Brain MRI, 01/30/2025 and head CT, 01/29/2025 RESULT: Acute ischemic change: None.  ASPECT Score = 10 Hemorrhage: No evidence of acute intracranial hemorrhage. ECASS hemorrhagic transformation score: Not Applicable Mass Lesion / Mass Effect: There is no evidence of an intracranial mass or extraaxial fluid collection.   No significant mass effect. Chronic change: Nonspecific low-attenuation in the white matter, likely secondary to chronic small vessel ischemic disease. Parenchyma: There is no significant volume loss.  The brain parenchyma is otherwise within normal limits for age. Ventricles: Normal caliber and morphology. Other: Scattered paranasal sinus mucosal thickening. The visualized calvarium, skull base, orbits and extracranial soft tissues are otherwise unremarkable. Localizer images: No additional findings.    IMPRESSION: No acute intracranial pathology. CRITICAL TEST/RESULTS: Notification initiated at 3/5/2025 2:45 PM.   Communicated with BRYCE DOE on 3/5/2025 2:47 PM . CR_1 : TREVOR   Transcribe Date/Time: Mar  5 2025  2:46P Dictated by : NIURKA GOODWIN MD This examination was interpreted and the report reviewed and electronically signed by: NIURKA GOODWIN MD on Mar  5 2025  2:50PM  EST    CT head wo IV contrast    Result Date: 1/29/2025  * * *Final Report* * * DATE OF EXAM: Jan 29 2025  9:19AM   [facility]   0502  -  CT BRAIN ATTACK WO IVCON  / ACCESSION #  [id number] PROCEDURE REASON: Focal neuro deficit, new, fixed, or worsening, < 4.5 hours, stroke suspected      * * * * Physician Interpretation * * * *  EXAMINATION: CT BRAIN ATTACK WO IVCON CLINICAL HISTORY: Left-sided weakness and facial droop Brain attack. TECHNIQUE: Routine CT of the brain without IV contrast. MQ: CTBA_4 CT Radiation dose: Integrated CT Dose-Length Product (DLP) for this visit =  1122.41 mGy*cm CT Dose  Reduction Employed: Iterative recon COMPARISON: None. RESULT: Acute ischemic change: None.  ASPECT Score = 10 Hemorrhage: No evidence of acute intracranial hemorrhage. ECASS hemorrhagic transformation score: Not Applicable Mass Lesion / Mass Effect: There is no evidence of an intracranial mass or extraaxial fluid collection.   No significant mass effect. Chronic change: There is minimal calcified atherosclerotic plaque at the bilateral carotid siphons. Parenchyma: There is no significant volume loss.  The brain parenchyma is otherwise within normal limits for age. Ventricles: Normal caliber and morphology. Other: There is mild mucosal thickening involving the bilateral maxillary sinuses The visualized calvarium, skull base, orbits and extracranial soft tissues are normal. Localizer images: No significant findings.    IMPRESSION: No acute findings. No acute infarction, intracranial hemorrhage or intracranial mass lesion. CRITICAL TEST/RESULTS: Notification initiated at 1/29/2025 9:24 AM.   Communicated with AUGUST JAMISON on 1/29/2025 9:24 AM . CR_1   : TREVOR   Transcribe Date/Time: Jan 29 2025  9:19A Dictated by : HILARIO CRAWFORD MD This examination was interpreted and the report reviewed and electronically signed by: HILARIO CRAWFORD MD on Jan 29 2025  9:29AM  EST      Assessment/Plan    1.  Patient reported left facial paresthesias and left facial droop and RUE/RLE weakness, 4/6/25  2.  Status post IV TNK 4/6/25  On my history, patient tells me she had new onset left facial paresthesia/left facial droop, some minor right facial paresthesias that she did not report, RLE tingling that she did not report, and LLE numbness  Has aspirin ALLERGY (hives)  Per outside records, patient was advised to take clopidogrel 1/2025, but discharge summary appears to indicate symptoms were thought to be migraine related, there was no mention of clopidogrel.  Patient is not taking clopidogrel currently.  Patient on  atorvastatin  Discussed ddx: stroke/TIA, ? Complicated migraine, less likely sz, vs other    3.  Worsening headache today  Repeat head CT without contrast done this afternoon showed no evidence of intracranial hemorrhage    4.  Chronic migraines as stated  Also smoking  S/p depakote (for epilepsy)  S/p Nurtec--doesn't help  On topiramate 50mg bid x2 months    5.   Epilepsy history, as stated  Last reported sz 2024  On topiramate 50mg bid x2 months  S/p phenobarbital  S/p depakote  S/p Keppra  S/p dilantin in past    6.   Tremors  Mostly action tremors--started in teenage years  States has never been diagnosed or treated  ?? Essential tremors  Has asthma, already on topiramate  Outpatient--? Consider primidone if wanting    7.   Concern for FND elsewhere (functional neurologic disorder)    8.  Polypharmacy    9.  Tobacco use      Recommendations:  Defer any and all antiplatelet/anticoag until after 24 hrs s/p IV TNK  Consider start clopidogrel 75mg daily on 4/7/25 after 1200h if no contraindication on imaging  Start high intensity statin  Check lipid panel/Hba1c  Do MRI brain wo  Check 2D echocardiogram with bubble study  7.   Allow permissive hypertension for 48-72 hours after stroke onset  8.   Control vascular risk factors  9.   Cardiac telemetry  10. PT/OT eval  11.   Quit smoking  12.  Will increase topiramate dose to 75mg bid (for migraines/stated epilepsy)  13.  Continue supportive care    Discussed, she has multiple neurological symptoms and stated diagnoses.  Likely will not be able to fix all of these during this admission. Currently no neurologist she follows with.    All questions answered. Please call with questions.      Iraj Zimmerman MD  4/6/2025  3:45 PM
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
## 2025-04-06 NOTE — ED PROVIDER NOTES
Emergency Department Provider Note        MEDICAL DECISION MAKING: STROKE ALERT      Chief complaint: Stroke symptoms    History/Exam limitations: none.   Additional history was obtained from patient, EMS personnel, and past medical records.    HPI: Maribeth Wayne  is a 49 y.o. with a history of COPD, anxiety and diabetes presented for stroke symptoms.  Patient was sitting in [employer] when she developed left-sided facial droop, difficulty speaking as well as weakness in the right upper and lower extremity.  Symptoms started about 30 minutes prior to arrival.  Estimated about 11:00.  Symptoms are constant, no modifying factors, severe.  She has had TIAs in the past.  Past medical history and surgical history reviewed and as documented.  History reviewed and as noted. past medical records reviewed.    Past medical records, triage/nursing notes and vital signs reviewed as available and as noted below  Active Ambulatory Problems     Diagnosis Date Noted    No Active Ambulatory Problems     Resolved Ambulatory Problems     Diagnosis Date Noted    No Resolved Ambulatory Problems     Past Medical History:   Diagnosis Date    Anxiety disorder, unspecified     Chronic obstructive pulmonary disease, unspecified (Multi)     Obstructive sleep apnea (adult) (pediatric)     Other conditions influencing health status     Personal history of other diseases of the musculoskeletal system and connective tissue     Personal history of other diseases of the nervous system and sense organs     Personal history of other diseases of the respiratory system         Past Surgical History:   Procedure Laterality Date    CHOLECYSTECTOMY  07/15/2013    Cholecystectomy    HERNIA REPAIR  07/15/2013    Hernia Repair    HYSTERECTOMY  07/15/2013    Hysterectomy    OTHER SURGICAL HISTORY  07/15/2013    Foot Surgery Left        No family history on file.           Last Known Well Time: 1100 approx       "  ------------------------------------------------------------------------------------------------------------------------------------------     Physical Exam:  BP (!) 137/97   Pulse 88   Temp 36.8 °C (98.3 °F) (Temporal)   Resp 20   Ht 1.626 m (5' 4\")   Wt 119 kg (262 lb 5.6 oz)   SpO2 94%   BMI 45.03 kg/m²         Physical Exam  Vitals and nursing note reviewed. Exam conducted with a chaperone present.   Constitutional:       Appearance: She is not ill-appearing or toxic-appearing.   HENT:      Head: Atraumatic.      Nose: Nose normal.      Mouth/Throat:      Mouth: Mucous membranes are moist.   Eyes:      Extraocular Movements: Extraocular movements intact.      Pupils: Pupils are equal, round, and reactive to light.   Cardiovascular:      Rate and Rhythm: Normal rate and regular rhythm.      Pulses: Normal pulses.      Heart sounds: Normal heart sounds.   Pulmonary:      Effort: Pulmonary effort is normal.      Breath sounds: Normal breath sounds.   Abdominal:      General: There is no distension.      Palpations: Abdomen is soft.      Tenderness: There is no abdominal tenderness.   Musculoskeletal:      Cervical back: Neck supple.   Skin:     General: Skin is warm and dry.      Capillary Refill: Capillary refill takes less than 2 seconds.   Neurological:      Mental Status: She is alert.      GCS: GCS eye subscore is 4. GCS verbal subscore is 5. GCS motor subscore is 6.      Sensory: Sensory deficit present.      Motor: Weakness present.      Coordination: Finger-Nose-Finger Test abnormal.   Psychiatric:         Behavior: Behavior normal.              Interval: Baseline  Time: 11:31  Person Administering Scale: Marty R LeJeune, DO     1a  Level of consciousness: 0=alert; keenly responsive   1b. LOC questions:  0=Performs both tasks correctly   1c. LOC commands: 0=Performs both tasks correctly   2.  Best Gaze: 0=normal   3. Visual: 0=No visual loss   4. Facial Palsy: 3=Complete paralysis of one or both " sides (absence of facial movement in the upper and lower face)   5a. Motor left arm: 0=No drift, limb holds 90 (or 45) degrees for full 10 seconds   5b.  Motor right arm: 1=Drift, limb holds 90 (or 45) degrees but drifts down before full 10 seconds: does not hit bed   6a. motor left le=No drift, limb holds 90 (or 45) degrees for full 10 seconds   6b  Motor right le=Some effort against gravity, limb cannot get to or maintain (if cured) 90 (or 45) degrees, drifts down to bed, but has some effort against gravity   7. Limb Ataxia: 1=Present in one limb   8.  Sensory: 1=Mild to moderate sensory loss; patient feels pinprick is less sharp or is dull on the affected side; there is a loss of superficial pain with pinprick but patient is aware She is being touched   9. Best Language:  0=No aphasia, normal   10. Dysarthria: 1=Mild to moderate, patient slurs at least some words and at worst, can be understood with some difficulty   11. Extinction and Inattention: 0=No abnormality     Total:   9         VAN: BURAK: Positive     ------------------------------------------------------------------------------------------------------------------------------------------       ED Course as of 25 1215   Sun 2025   1145 Given the potential need for tPA after consulting with neurology, I did speak in detail with the patient regards to administering tPA.  I did discuss the risk and benefits.  I discussed the benefits of reversing her current symptoms but also making symptoms worse, causing bleeding, causing headache, causing vomiting and this also includes bleeding into the intestine, as well as the brain requiring surgery and possibly could be detrimental.  We did discuss these risks in detail, answered all patient's questions.  She is agreeable to proceed with tPA. [ML]   1159 tPA Contraindications for Ischemic Stroke    RESULT SUMMARY:       Patient eligible for tPA.      INPUTS:  Age >=18 -> 1 = Yes  Clinical  diagnosis of ischemic stroke causing neurological deficit -> 1 = Yes  Time of symptom onset <4.5 hours -> 1 = Yes  Intracranial hemorrhage on CT -> 0 = No  Clinical presentation suggests subarachnoid hemorrhage -> 0 = No  Neurosurgery, head trauma, or stroke in past 3 months -> 0 = No  Uncontrolled hypertension (>185 mmHg SBP or >110 mmHg DBP) -> 0 = No  History of intracranial hemorrhage -> 0 = No  Known intracranial arteriovenous malformation, neoplasm, or aneurysm -> 0 = No  Active internal bleeding -> 0 = No  Suspected/confirmed endocarditis -> 0 = No  Known bleeding diathesis -> 0 = No  Abnormal blood glucose (<50 mg/dL) -> 0 = No  Intra-axial intracranial neoplasm -> 0 = No  Gastrointestinal (GI) malignancy -> 0 = No  Gastrointestinal (GI) hemorrhage within the last 21 days -> 0 = No  Intracranial or intraspinal surgery within the last 3 months -> 0 = No  CT brain imaging shows extensive regions of clear hypoattenuation -> 0 = No  Stroke is known or suspected to be associated with aortic arch dissection -> 0 = No  Only minor or rapidly improving stroke symptoms -> 0 = No  Major surgery or serious non-head trauma in the previous 14 days -> 0 = No  History of gastrointestinal or urinary tract hemorrhage within 21 days -> 0 = No  Seizure at stroke onset -> 0 = No  Recent arterial puncture at a noncompressible site -> 0 = No  Recent lumbar puncture -> 0 = No  Post myocardial infarction pericarditis -> 0 = No  Pregnancy -> 0 = No  Age >80 years -> 0 = No  History of prior stroke and diabetes -> 0 = No  Any active anticoagulant use (even with INR <1.7)  -> 0 = No  NIHSS >25 -> 0 = No  CT shows multilobar infarction (hypodensity >1/3 cerebral hemisphere) -> 0 = No   [ML]   1200 Twelve-lead EKG notes sinus rhythm 86 bpm.  Normal intervals.  Normal morphology overall, no ST elevations or depressions. [ML]   1203 Spoke with Dr. Burks with stroke neurology who advised that she would see the patient and is a potential tPA  candidate. [ML]      ED Course User Index  [ML] Marty R Lejeune, DO        EKG interpreted by myself     Independent Interpretation of Studies: I independently interpreted the CT head and see No obvious evidence of intracranial hemorrhage        IV Thrombolysis IV Thrombolysis Checklist        IV Thrombolysis Given: Yes Thrombolysis Administration: administration time 1200 Patient meets inclusion and exclusion criteria with expected benefits exceeding the risks of IV thrombolysis therapy or withholding therapy. Patient/ family understand potential risks & benefits and consent to IV Thrombolysis. Discussed the diagnosis of suspected ischemic stroke and options for treatment, including alternative treatments. For patients meeting inclusion and exclusion criteria, the expected benefits exceed the risks of IV thrombolysis therapy or withholding therapy. The main risk of IV thrombolysis therapy is bleeding into the brain or body that may require blood transfusions or lead to death. In clinical studies, the rate of death was not higher in patients who received IV thrombolysis compared to those who did not. Other risks include allergic reactions, and with any procedure there is always the possibility of an unexpected complication.  Patient is <18 - [place] [facility] with Acute suspected Stroke & the Pediatric IV Thrombolysis Consent. Consent is completed on a paper document and if criteria is met/ benefit outweigh the risk the thrombolytic Alteplase should be given.  :99}Were there delays to thrombolysis administration?: Yes other patient is initially unsure about proceeding with tPA or not .  Extensive discussion held.      ED COURSE  Current subjective and objective findings, differential diagnosis includes but not limited to acute stroke, TIA, seizure, electrolyte disturbance      Work-up performed to evaluate for differential diagnosis as clinically indicated   Orders Placed  This Encounter   Procedures    XR chest 1 view    CT brain attack angio head and neck W and WO IV contrast    CT brain attack head wo IV contrast    Troponin I, High Sensitivity    Comprehensive metabolic panel    CBC and Auto Differential    Magnesium    Protime-INR    Phosphorus    NPO Diet; Effective now    Vital Signs    Neuro checks    Notify provider (specify parameters)    Monitor intake and output    Nursing swallow assessment    NIHSS    Delay, if possible, inserting an indwelling bladder catheter for 1 hour and nasogastric/ CVP/ IA catheters for 24 hours after thrombolysis completion.    Cardiac Monitoring - ED/PACU Only    Physiologic Monitoring Goal Parameters    Neurovascular checks    Notify provider (specify parameters)    ECG 12 Lead    ECG 12 Lead    Insert and maintain peripheral IV    Aspiration precautions        Labs and imaging reviewed by me  and note _  Labs Reviewed   COMPREHENSIVE METABOLIC PANEL - Abnormal       Result Value    Glucose 200 (*)     Sodium 138      Potassium 3.0 (*)     Chloride 103      Bicarbonate 24      Anion Gap 14      Urea Nitrogen 5 (*)     Creatinine 0.62      eGFR >90      Calcium 9.2      Albumin 3.5      Alkaline Phosphatase 85      Total Protein 6.0 (*)     AST 6 (*)     Bilirubin, Total 0.4      ALT 6 (*)    CBC WITH AUTO DIFFERENTIAL - Abnormal    WBC 9.4      nRBC 0.0      RBC 5.27 (*)     Hemoglobin 14.7      Hematocrit 44.4      MCV 84      MCH 27.9      MCHC 33.1      RDW 14.2      Platelets 235      Neutrophils % 74.8      Immature Granulocytes %, Automated 0.7      Lymphocytes % 14.3      Monocytes % 7.9      Eosinophils % 1.9      Basophils % 0.4      Neutrophils Absolute 6.99      Immature Granulocytes Absolute, Automated 0.07      Lymphocytes Absolute 1.34      Monocytes Absolute 0.74      Eosinophils Absolute 0.18      Basophils Absolute 0.04     POCT GLUCOSE - Abnormal    POCT Glucose 194 (*)    TROPONIN I, HIGH SENSITIVITY - Normal    Troponin I,  High Sensitivity 3      Narrative:     Less than 99th percentile of normal range cutoff-  Female and children under 18 years old <14 ng/L; Male <21 ng/L: Negative  Repeat testing should be performed if clinically indicated.     Female and children under 18 years old 14-50 ng/L; Male 21-50 ng/L:  Consistent with possible cardiac damage and possible increased clinical   risk. Serial measurements may help to assess extent of myocardial damage.     >50 ng/L: Consistent with cardiac damage, increased clinical risk and  myocardial infarction. Serial measurements may help assess extent of   myocardial damage.      NOTE: Children less than 1 year old may have higher baseline troponin   levels and results should be interpreted in conjunction with the overall   clinical context.     NOTE: Troponin I testing is performed using a different   testing methodology at [facility] than at other   [facility]. Direct result comparisons should only   be made within the same method.   MAGNESIUM - Normal    Magnesium 1.70     PROTIME-INR - Normal    Protime 12.4      INR 1.1     PHOSPHORUS - Normal    Phosphorus 2.8        XR chest 1 view   Final Result   No evidence of acute cardiopulmonary process.        Signed by: Laura Pineda 4/6/2025 12:37 PM   Dictation workstation:   [id number]      CT brain attack angio head and neck W and WO IV contrast   Final Result   No evidence for significant stenosis or large branch vessel cutoffs   of the major intracranial or extracranial arterial vasculature. MRI   would be more sensitive and specific for recent ischemia if   clinically suspected.        MACRO:   None        Signed by: Ezio Mills 4/6/2025 12:02 PM   Dictation workstation:   [id number]      CT brain attack head wo IV contrast   Final Result   No evidence of acute cortical infarct or intracranial hemorrhage.        Minimal mucosal thickening paranasal sinuses.        MACRO:   Juliano Hamilton discussed the  significance and urgency of this   critical finding by secure chat with  MARTY LEJEUNE on 4/6/2025 at   11:40 am.  (**-RCF-**) Findings:  See findings.             Signed by: Juliano Hamilton 4/6/2025 11:40 AM   Dictation workstation:   [id number]           Pt course which Intervention and treatment included   Medications   labetaloL (Normodyne,Trandate) injection 10 mg (has no administration in time range)   hydrALAZINE (Apresoline) injection 10 mg (has no administration in time range)   sodium chloride 0.9 % bolus 1,000 mL (1,000 mL intravenous New Bag 4/6/25 1221)   iohexol (OMNIPaque) 350 mg iodine/mL solution 90 mL (90 mL intravenous Given 4/6/25 1142)   tenecteplase (TNKASE) injection for STROKE 25 mg (25 mg intravenous Given 4/6/25 1200)   oxyCODONE (Roxicodone) immediate release tablet 5 mg (5 mg oral Given 4/6/25 1301)        ED Course as of 04/06/25 1217   Sun Apr 06, 2025   1145 Given the potential need for tPA after consulting with neurology, I did speak in detail with the patient regards to administering tPA.  I did discuss the risk and benefits.  I discussed the benefits of reversing her current symptoms but also making symptoms worse, causing bleeding, causing headache, causing vomiting and this also includes bleeding into the intestine, as well as the brain requiring surgery and possibly could be detrimental.  We did discuss these risks in detail, answered all patient's questions.  She is agreeable to proceed with tPA. [ML]   1158 tPA Contraindications for Ischemic Stroke    RESULT SUMMARY:       Patient eligible for tPA.      INPUTS:  Age >=18 -> 1 = Yes  Clinical diagnosis of ischemic stroke causing neurological deficit -> 1 = Yes  Time of symptom onset <4.5 hours -> 1 = Yes  Intracranial hemorrhage on CT -> 0 = No  Clinical presentation suggests subarachnoid hemorrhage -> 0 = No  Neurosurgery, head trauma, or stroke in past 3 months -> 0 = No  Uncontrolled hypertension (>185 mmHg SBP or >110 mmHg  DBP) -> 0 = No  History of intracranial hemorrhage -> 0 = No  Known intracranial arteriovenous malformation, neoplasm, or aneurysm -> 0 = No  Active internal bleeding -> 0 = No  Suspected/confirmed endocarditis -> 0 = No  Known bleeding diathesis -> 0 = No  Abnormal blood glucose (<50 mg/dL) -> 0 = No  Intra-axial intracranial neoplasm -> 0 = No  Gastrointestinal (GI) malignancy -> 0 = No  Gastrointestinal (GI) hemorrhage within the last 21 days -> 0 = No  Intracranial or intraspinal surgery within the last 3 months -> 0 = No  CT brain imaging shows extensive regions of clear hypoattenuation -> 0 = No  Stroke is known or suspected to be associated with aortic arch dissection -> 0 = No  Only minor or rapidly improving stroke symptoms -> 0 = No  Major surgery or serious non-head trauma in the previous 14 days -> 0 = No  History of gastrointestinal or urinary tract hemorrhage within 21 days -> 0 = No  Seizure at stroke onset -> 0 = No  Recent arterial puncture at a noncompressible site -> 0 = No  Recent lumbar puncture -> 0 = No  Post myocardial infarction pericarditis -> 0 = No  Pregnancy -> 0 = No  Age >80 years -> 0 = No  History of prior stroke and diabetes -> 0 = No  Any active anticoagulant use (even with INR <1.7)  -> 0 = No  NIHSS >25 -> 0 = No  CT shows multilobar infarction (hypodensity >1/3 cerebral hemisphere) -> 0 = No   [ML]   1200 Twelve-lead EKG notes sinus rhythm 86 bpm.  Normal intervals.  Normal morphology overall, no ST elevations or depressions. [ML]   1203 Spoke with Dr. Burks with stroke neurology who advised that she would see the patient and is a potential tPA candidate. [ML]      ED Course User Index  [ML] Marty R Lejeune, DO         Diagnoses as of 04/06/25 1217   Cerebrovascular accident (CVA), unspecified mechanism (Multi)      Procedure  Critical Care    Performed by: Marty R Lejeune, DO  Authorized by: Marty R Lejeune, DO    Critical care provider statement:     Critical care time  (minutes):  33    Critical care time was exclusive of:  Separately billable procedures and treating other patients    Critical care was necessary to treat or prevent imminent or life-threatening deterioration of the following conditions:  CNS failure or compromise    Critical care was time spent personally by me on the following activities:  Development of treatment plan with patient or surrogate, discussions with consultants, discussions with primary provider, evaluation of patient's response to treatment, examination of patient, review of old charts, re-evaluation of patient's condition, pulse oximetry, ordering and review of radiographic studies, ordering and review of laboratory studies and ordering and performing treatments and interventions    Care discussed with: admitting provider         Disposition: Admit to hospital.  All imaging and laboratory results were discussed with the patient in detail including acute as well as incidental findings.  I discussed the differential, results and treatment plan with the patient and/or family/friend/caregiver if present.  I discussed the reason and need for admission to the hospital as well as risk and benefits.  Patient/family/caregiver/friend is in agreement with transfer as well as choice of facility to be transferred to.  Education and reassurance provided regards to presumed diagnosis was given.  Patient/family/caregiver understand and all questions answered.      Diagnosis:  1. Cerebrovascular accident (CVA), unspecified mechanism (Multi)                Marty R Lejeune, DO  Resident  04/06/25 1400

## 2025-04-07 ENCOUNTER — APPOINTMENT (OUTPATIENT)
Dept: CARDIOLOGY | Facility: HOSPITAL | Age: 50
DRG: 062 | End: 2025-04-07
Payer: COMMERCIAL

## 2025-04-07 ENCOUNTER — APPOINTMENT (OUTPATIENT)
Dept: RADIOLOGY | Facility: HOSPITAL | Age: 50
DRG: 062 | End: 2025-04-07
Payer: COMMERCIAL

## 2025-04-07 VITALS
OXYGEN SATURATION: 96 % | DIASTOLIC BLOOD PRESSURE: 76 MMHG | HEART RATE: 74 BPM | SYSTOLIC BLOOD PRESSURE: 118 MMHG | BODY MASS INDEX: 44.79 KG/M2 | TEMPERATURE: 98.3 F | HEIGHT: 64 IN | RESPIRATION RATE: 21 BRPM | WEIGHT: 262.35 LBS

## 2025-04-07 LAB
ANION GAP SERPL CALC-SCNC: 10 MMOL/L (ref 10–20)
ATRIAL RATE: 87 BPM
BUN SERPL-MCNC: 8 MG/DL (ref 6–23)
CALCIUM SERPL-MCNC: 9.3 MG/DL (ref 8.6–10.3)
CHLORIDE SERPL-SCNC: 110 MMOL/L (ref 98–107)
CHOLEST SERPL-MCNC: 119 MG/DL (ref 0–199)
CHOLESTEROL/HDL RATIO: 3.5
CO2 SERPL-SCNC: 26 MMOL/L (ref 21–32)
CREAT SERPL-MCNC: 0.61 MG/DL (ref 0.5–1.05)
EGFRCR SERPLBLD CKD-EPI 2021: >90 ML/MIN/1.73M*2
ERYTHROCYTE [DISTWIDTH] IN BLOOD BY AUTOMATED COUNT: 14.5 % (ref 11.5–14.5)
EST. AVERAGE GLUCOSE BLD GHB EST-MCNC: 128 MG/DL
GLUCOSE BLD MANUAL STRIP-MCNC: 130 MG/DL (ref 74–99)
GLUCOSE BLD MANUAL STRIP-MCNC: 139 MG/DL (ref 74–99)
GLUCOSE BLD MANUAL STRIP-MCNC: 142 MG/DL (ref 74–99)
GLUCOSE BLD MANUAL STRIP-MCNC: 198 MG/DL (ref 74–99)
GLUCOSE SERPL-MCNC: 122 MG/DL (ref 74–99)
HBA1C MFR BLD: 6.1 %
HCT VFR BLD AUTO: 43.8 % (ref 36–46)
HDLC SERPL-MCNC: 33.9 MG/DL
HGB BLD-MCNC: 14.1 G/DL (ref 12–16)
LDLC SERPL CALC-MCNC: 58 MG/DL
MCH RBC QN AUTO: 28.2 PG (ref 26–34)
MCHC RBC AUTO-ENTMCNC: 32.2 G/DL (ref 32–36)
MCV RBC AUTO: 88 FL (ref 80–100)
NON HDL CHOLESTEROL: 85 MG/DL (ref 0–149)
NRBC BLD-RTO: 0 /100 WBCS (ref 0–0)
P AXIS: 47 DEGREES
PLATELET # BLD AUTO: 225 X10*3/UL (ref 150–450)
POTASSIUM SERPL-SCNC: 3.9 MMOL/L (ref 3.5–5.3)
PR INTERVAL: 117 MS
Q ONSET: 252 MS
QRS COUNT: 14 BEATS
QRS DURATION: 97 MS
QT INTERVAL: 360 MS
QTC CALCULATION(BAZETT): 431 MS
QTC FREDERICIA: 406 MS
R AXIS: 47 DEGREES
RBC # BLD AUTO: 5 X10*6/UL (ref 4–5.2)
SODIUM SERPL-SCNC: 142 MMOL/L (ref 136–145)
T AXIS: 47 DEGREES
T OFFSET: 432 MS
TRIGL SERPL-MCNC: 138 MG/DL (ref 0–149)
VENTRICULAR RATE: 86 BPM
VLDL: 28 MG/DL (ref 0–40)
WBC # BLD AUTO: 8.5 X10*3/UL (ref 4.4–11.3)

## 2025-04-07 PROCEDURE — 97535 SELF CARE MNGMENT TRAINING: CPT | Mod: GO | Performed by: OCCUPATIONAL THERAPIST

## 2025-04-07 PROCEDURE — 94640 AIRWAY INHALATION TREATMENT: CPT

## 2025-04-07 PROCEDURE — 72148 MRI LUMBAR SPINE W/O DYE: CPT

## 2025-04-07 PROCEDURE — 99233 SBSQ HOSP IP/OBS HIGH 50: CPT | Performed by: NURSE PRACTITIONER

## 2025-04-07 PROCEDURE — 70551 MRI BRAIN STEM W/O DYE: CPT

## 2025-04-07 PROCEDURE — 2500000001 HC RX 250 WO HCPCS SELF ADMINISTERED DRUGS (ALT 637 FOR MEDICARE OP): Performed by: INTERNAL MEDICINE

## 2025-04-07 PROCEDURE — 99233 SBSQ HOSP IP/OBS HIGH 50: CPT | Performed by: INTERNAL MEDICINE

## 2025-04-07 PROCEDURE — 93308 TTE F-UP OR LMTD: CPT

## 2025-04-07 PROCEDURE — 80048 BASIC METABOLIC PNL TOTAL CA: CPT | Performed by: INTERNAL MEDICINE

## 2025-04-07 PROCEDURE — 70551 MRI BRAIN STEM W/O DYE: CPT | Performed by: RADIOLOGY

## 2025-04-07 PROCEDURE — 36415 COLL VENOUS BLD VENIPUNCTURE: CPT | Performed by: INTERNAL MEDICINE

## 2025-04-07 PROCEDURE — 94664 DEMO&/EVAL PT USE INHALER: CPT

## 2025-04-07 PROCEDURE — 1200000002 HC GENERAL ROOM WITH TELEMETRY DAILY

## 2025-04-07 PROCEDURE — 2500000004 HC RX 250 GENERAL PHARMACY W/ HCPCS (ALT 636 FOR OP/ED): Performed by: INTERNAL MEDICINE

## 2025-04-07 PROCEDURE — 93308 TTE F-UP OR LMTD: CPT | Performed by: INTERNAL MEDICINE

## 2025-04-07 PROCEDURE — 97165 OT EVAL LOW COMPLEX 30 MIN: CPT | Mod: GO | Performed by: OCCUPATIONAL THERAPIST

## 2025-04-07 PROCEDURE — 83036 HEMOGLOBIN GLYCOSYLATED A1C: CPT | Mod: PORLAB

## 2025-04-07 PROCEDURE — 9420000001 HC RT PATIENT EDUCATION 5 MIN

## 2025-04-07 PROCEDURE — 85027 COMPLETE CBC AUTOMATED: CPT | Performed by: INTERNAL MEDICINE

## 2025-04-07 PROCEDURE — 82947 ASSAY GLUCOSE BLOOD QUANT: CPT

## 2025-04-07 PROCEDURE — 2500000002 HC RX 250 W HCPCS SELF ADMINISTERED DRUGS (ALT 637 FOR MEDICARE OP, ALT 636 FOR OP/ED): Performed by: INTERNAL MEDICINE

## 2025-04-07 PROCEDURE — 72148 MRI LUMBAR SPINE W/O DYE: CPT | Performed by: RADIOLOGY

## 2025-04-07 PROCEDURE — 2500000001 HC RX 250 WO HCPCS SELF ADMINISTERED DRUGS (ALT 637 FOR MEDICARE OP)

## 2025-04-07 PROCEDURE — 97161 PT EVAL LOW COMPLEX 20 MIN: CPT | Mod: GP

## 2025-04-07 PROCEDURE — 97530 THERAPEUTIC ACTIVITIES: CPT | Mod: GP

## 2025-04-07 PROCEDURE — 80061 LIPID PANEL: CPT

## 2025-04-07 PROCEDURE — 99291 CRITICAL CARE FIRST HOUR: CPT | Performed by: INTERNAL MEDICINE

## 2025-04-07 RX ORDER — FAMOTIDINE 20 MG/1
40 TABLET, FILM COATED ORAL EVERY EVENING
Status: DISCONTINUED | OUTPATIENT
Start: 2025-04-07 | End: 2025-04-08 | Stop reason: HOSPADM

## 2025-04-07 RX ORDER — HYDROXYZINE PAMOATE 50 MG/1
50 CAPSULE ORAL 3 TIMES DAILY PRN
COMMUNITY

## 2025-04-07 RX ORDER — LORAZEPAM 2 MG/ML
2 INJECTION INTRAMUSCULAR ONCE
Status: COMPLETED | OUTPATIENT
Start: 2025-04-07 | End: 2025-04-07

## 2025-04-07 RX ORDER — IPRATROPIUM BROMIDE AND ALBUTEROL SULFATE 2.5; .5 MG/3ML; MG/3ML
3 SOLUTION RESPIRATORY (INHALATION) EVERY 4 HOURS PRN
COMMUNITY

## 2025-04-07 RX ORDER — SEMAGLUTIDE 2.68 MG/ML
2 INJECTION, SOLUTION SUBCUTANEOUS
COMMUNITY

## 2025-04-07 RX ORDER — LORAZEPAM 1 MG/1
1 TABLET ORAL ONCE
Status: DISCONTINUED | OUTPATIENT
Start: 2025-04-07 | End: 2025-04-07

## 2025-04-07 RX ORDER — LORAZEPAM 2 MG/ML
1 INJECTION INTRAMUSCULAR ONCE
Status: COMPLETED | OUTPATIENT
Start: 2025-04-07 | End: 2025-04-07

## 2025-04-07 RX ORDER — MIRTAZAPINE 15 MG/1
15 TABLET, FILM COATED ORAL NIGHTLY
COMMUNITY

## 2025-04-07 RX ORDER — TRAMADOL HYDROCHLORIDE 50 MG/1
50 TABLET ORAL EVERY 6 HOURS PRN
Status: DISCONTINUED | OUTPATIENT
Start: 2025-04-07 | End: 2025-04-08 | Stop reason: HOSPADM

## 2025-04-07 RX ORDER — FAMOTIDINE 40 MG/1
40 TABLET, FILM COATED ORAL EVERY EVENING
COMMUNITY

## 2025-04-07 RX ORDER — MEPOLIZUMAB 100 MG/ML
100 INJECTION, SOLUTION SUBCUTANEOUS
COMMUNITY

## 2025-04-07 RX ORDER — FLUTICASONE PROPIONATE AND SALMETEROL XINAFOATE 230; 21 UG/1; UG/1
2 AEROSOL, METERED RESPIRATORY (INHALATION)
COMMUNITY

## 2025-04-07 RX ORDER — ACETAMINOPHEN 325 MG/1
650 TABLET ORAL EVERY 6 HOURS PRN
Status: DISCONTINUED | OUTPATIENT
Start: 2025-04-07 | End: 2025-04-08 | Stop reason: HOSPADM

## 2025-04-07 RX ORDER — PANTOPRAZOLE SODIUM 40 MG/1
40 TABLET, DELAYED RELEASE ORAL
COMMUNITY

## 2025-04-07 RX ORDER — LURASIDONE HYDROCHLORIDE 40 MG/1
80 TABLET, FILM COATED ORAL
Status: DISCONTINUED | OUTPATIENT
Start: 2025-04-07 | End: 2025-04-07

## 2025-04-07 RX ORDER — ATORVASTATIN CALCIUM 40 MG/1
40 TABLET, FILM COATED ORAL DAILY
COMMUNITY

## 2025-04-07 RX ORDER — ALBUTEROL SULFATE 0.83 MG/ML
2.5 SOLUTION RESPIRATORY (INHALATION) EVERY 4 HOURS PRN
COMMUNITY

## 2025-04-07 RX ADMIN — ROPINIROLE HYDROCHLORIDE 4 MG: 1 TABLET, FILM COATED ORAL at 22:13

## 2025-04-07 RX ADMIN — TOPIRAMATE 75 MG: 50 TABLET ORAL at 08:16

## 2025-04-07 RX ADMIN — TRAZODONE HYDROCHLORIDE 100 MG: 50 TABLET ORAL at 22:13

## 2025-04-07 RX ADMIN — BUTALBITAL, ACETAMINOPHEN, AND CAFFEINE 1 TABLET: 325; 50; 40 TABLET ORAL at 08:17

## 2025-04-07 RX ADMIN — IPRATROPIUM BROMIDE AND ALBUTEROL SULFATE 3 ML: 2.5; .5 SOLUTION RESPIRATORY (INHALATION) at 06:19

## 2025-04-07 RX ADMIN — HYDROXYZINE HYDROCHLORIDE 50 MG: 25 TABLET, FILM COATED ORAL at 08:15

## 2025-04-07 RX ADMIN — LORAZEPAM 2 MG: 2 INJECTION INTRAMUSCULAR; INTRAVENOUS at 17:21

## 2025-04-07 RX ADMIN — LORAZEPAM 1 MG: 2 INJECTION INTRAMUSCULAR; INTRAVENOUS at 12:30

## 2025-04-07 RX ADMIN — ALPRAZOLAM 2 MG: 0.5 TABLET ORAL at 20:05

## 2025-04-07 RX ADMIN — FAMOTIDINE 40 MG: 20 TABLET, FILM COATED ORAL at 22:13

## 2025-04-07 RX ADMIN — GABAPENTIN 800 MG: 400 CAPSULE ORAL at 15:17

## 2025-04-07 RX ADMIN — FLUTICASONE FUROATE AND VILANTEROL TRIFENATATE 1 PUFF: 200; 25 POWDER RESPIRATORY (INHALATION) at 08:18

## 2025-04-07 RX ADMIN — GABAPENTIN 800 MG: 400 CAPSULE ORAL at 08:15

## 2025-04-07 RX ADMIN — LORAZEPAM 1 MG: 2 INJECTION INTRAMUSCULAR; INTRAVENOUS at 12:55

## 2025-04-07 RX ADMIN — GABAPENTIN 800 MG: 400 CAPSULE ORAL at 22:13

## 2025-04-07 RX ADMIN — HYDROXYZINE HYDROCHLORIDE 50 MG: 25 TABLET, FILM COATED ORAL at 15:17

## 2025-04-07 RX ADMIN — TRAMADOL HYDROCHLORIDE 50 MG: 50 TABLET, COATED ORAL at 17:19

## 2025-04-07 RX ADMIN — ALPRAZOLAM 2 MG: 0.5 TABLET ORAL at 08:21

## 2025-04-07 SDOH — ECONOMIC STABILITY: TRANSPORTATION INSECURITY: IN THE PAST 12 MONTHS, HAS LACK OF TRANSPORTATION KEPT YOU FROM MEDICAL APPOINTMENTS OR FROM GETTING MEDICATIONS?: YES

## 2025-04-07 SDOH — SOCIAL STABILITY: SOCIAL INSECURITY: WITHIN THE LAST YEAR, HAVE YOU BEEN HUMILIATED OR EMOTIONALLY ABUSED IN OTHER WAYS BY YOUR PARTNER OR EX-PARTNER?: NO

## 2025-04-07 SDOH — SOCIAL STABILITY: SOCIAL INSECURITY
WITHIN THE LAST YEAR, HAVE YOU BEEN KICKED, HIT, SLAPPED, OR OTHERWISE PHYSICALLY HURT BY YOUR PARTNER OR EX-PARTNER?: NO

## 2025-04-07 SDOH — HEALTH STABILITY: PHYSICAL HEALTH: ON AVERAGE, HOW MANY MINUTES DO YOU ENGAGE IN EXERCISE AT THIS LEVEL?: 0 MIN

## 2025-04-07 SDOH — SOCIAL STABILITY: SOCIAL INSECURITY: DO YOU FEEL ANYONE HAS EXPLOITED OR TAKEN ADVANTAGE OF YOU FINANCIALLY OR OF YOUR PERSONAL PROPERTY?: NO

## 2025-04-07 SDOH — SOCIAL STABILITY: SOCIAL INSECURITY: ARE YOU OR HAVE YOU BEEN THREATENED OR ABUSED PHYSICALLY, EMOTIONALLY, OR SEXUALLY BY ANYONE?: NO

## 2025-04-07 SDOH — SOCIAL STABILITY: SOCIAL INSECURITY: HAS ANYONE EVER THREATENED TO HURT YOUR FAMILY OR YOUR PETS?: NO

## 2025-04-07 SDOH — SOCIAL STABILITY: SOCIAL NETWORK: HOW OFTEN DO YOU GET TOGETHER WITH FRIENDS OR RELATIVES?: THREE TIMES A WEEK

## 2025-04-07 SDOH — ECONOMIC STABILITY: HOUSING INSECURITY: AT ANY TIME IN THE PAST 12 MONTHS, WERE YOU HOMELESS OR LIVING IN A SHELTER (INCLUDING NOW)?: NO

## 2025-04-07 SDOH — SOCIAL STABILITY: SOCIAL NETWORK
DO YOU BELONG TO ANY CLUBS OR ORGANIZATIONS SUCH AS CHURCH GROUPS, UNIONS, FRATERNAL OR ATHLETIC GROUPS, OR SCHOOL GROUPS?: YES

## 2025-04-07 SDOH — ECONOMIC STABILITY: HOUSING INSECURITY: IN THE LAST 12 MONTHS, WAS THERE A TIME WHEN YOU WERE NOT ABLE TO PAY THE MORTGAGE OR RENT ON TIME?: NO

## 2025-04-07 SDOH — SOCIAL STABILITY: SOCIAL INSECURITY: ARE YOU MARRIED, WIDOWED, DIVORCED, SEPARATED, NEVER MARRIED, OR LIVING WITH A PARTNER?: MARRIED

## 2025-04-07 SDOH — SOCIAL STABILITY: SOCIAL INSECURITY
WITHIN THE LAST YEAR, HAVE YOU BEEN RAPED OR FORCED TO HAVE ANY KIND OF SEXUAL ACTIVITY BY YOUR PARTNER OR EX-PARTNER?: NO

## 2025-04-07 SDOH — ECONOMIC STABILITY: HOUSING INSECURITY: IN THE PAST 12 MONTHS, HOW MANY TIMES HAVE YOU MOVED WHERE YOU WERE LIVING?: 0

## 2025-04-07 SDOH — ECONOMIC STABILITY: INCOME INSECURITY
IN THE PAST 12 MONTHS HAS THE ELECTRIC, GAS, OIL, OR WATER COMPANY THREATENED TO SHUT OFF SERVICES IN YOUR HOME?: PATIENT UNABLE TO ANSWER

## 2025-04-07 SDOH — HEALTH STABILITY: PHYSICAL HEALTH
HOW OFTEN DO YOU NEED TO HAVE SOMEONE HELP YOU WHEN YOU READ INSTRUCTIONS, PAMPHLETS, OR OTHER WRITTEN MATERIAL FROM YOUR DOCTOR OR PHARMACY?: RARELY

## 2025-04-07 SDOH — SOCIAL STABILITY: SOCIAL NETWORK: HOW OFTEN DO YOU ATTEND CHURCH OR RELIGIOUS SERVICES?: MORE THAN 4 TIMES PER YEAR

## 2025-04-07 SDOH — ECONOMIC STABILITY: FOOD INSECURITY: WITHIN THE PAST 12 MONTHS, YOU WORRIED THAT YOUR FOOD WOULD RUN OUT BEFORE YOU GOT THE MONEY TO BUY MORE.: OFTEN TRUE

## 2025-04-07 SDOH — HEALTH STABILITY: MENTAL HEALTH
DO YOU FEEL STRESS - TENSE, RESTLESS, NERVOUS, OR ANXIOUS, OR UNABLE TO SLEEP AT NIGHT BECAUSE YOUR MIND IS TROUBLED ALL THE TIME - THESE DAYS?: RATHER MUCH

## 2025-04-07 SDOH — SOCIAL STABILITY: SOCIAL INSECURITY: DO YOU FEEL UNSAFE GOING BACK TO THE PLACE WHERE YOU ARE LIVING?: NO

## 2025-04-07 SDOH — ECONOMIC STABILITY: FOOD INSECURITY: HOW HARD IS IT FOR YOU TO PAY FOR THE VERY BASICS LIKE FOOD, HOUSING, MEDICAL CARE, AND HEATING?: HARD

## 2025-04-07 SDOH — SOCIAL STABILITY: SOCIAL INSECURITY: WITHIN THE LAST YEAR, HAVE YOU BEEN AFRAID OF YOUR PARTNER OR EX-PARTNER?: NO

## 2025-04-07 SDOH — SOCIAL STABILITY: SOCIAL INSECURITY: DOES ANYONE TRY TO KEEP YOU FROM HAVING/CONTACTING OTHER FRIENDS OR DOING THINGS OUTSIDE YOUR HOME?: NO

## 2025-04-07 SDOH — HEALTH STABILITY: PHYSICAL HEALTH: ON AVERAGE, HOW MANY DAYS PER WEEK DO YOU ENGAGE IN MODERATE TO STRENUOUS EXERCISE (LIKE A BRISK WALK)?: 0 DAYS

## 2025-04-07 SDOH — SOCIAL STABILITY: SOCIAL INSECURITY: HAVE YOU HAD ANY THOUGHTS OF HARMING ANYONE ELSE?: NO

## 2025-04-07 SDOH — ECONOMIC STABILITY: FOOD INSECURITY: WITHIN THE PAST 12 MONTHS, THE FOOD YOU BOUGHT JUST DIDN'T LAST AND YOU DIDN'T HAVE MONEY TO GET MORE.: OFTEN TRUE

## 2025-04-07 SDOH — SOCIAL STABILITY: SOCIAL NETWORK
IN A TYPICAL WEEK, HOW MANY TIMES DO YOU TALK ON THE PHONE WITH FAMILY, FRIENDS, OR NEIGHBORS?: MORE THAN THREE TIMES A WEEK

## 2025-04-07 SDOH — SOCIAL STABILITY: SOCIAL INSECURITY: HAVE YOU HAD THOUGHTS OF HARMING ANYONE ELSE?: NO

## 2025-04-07 SDOH — SOCIAL STABILITY: SOCIAL INSECURITY: ABUSE: ADULT

## 2025-04-07 SDOH — SOCIAL STABILITY: SOCIAL INSECURITY: ARE THERE ANY APPARENT SIGNS OF INJURIES/BEHAVIORS THAT COULD BE RELATED TO ABUSE/NEGLECT?: NO

## 2025-04-07 SDOH — SOCIAL STABILITY: SOCIAL NETWORK: HOW OFTEN DO YOU ATTEND MEETINGS OF THE CLUBS OR ORGANIZATIONS YOU BELONG TO?: MORE THAN 4 TIMES PER YEAR

## 2025-04-07 SDOH — SOCIAL STABILITY: SOCIAL INSECURITY: WERE YOU ABLE TO COMPLETE ALL THE BEHAVIORAL HEALTH SCREENINGS?: YES

## 2025-04-07 ASSESSMENT — LIFESTYLE VARIABLES
HOW OFTEN DO YOU HAVE 6 OR MORE DRINKS ON ONE OCCASION: NEVER
HOW OFTEN DO YOU HAVE A DRINK CONTAINING ALCOHOL: NEVER
AUDIT-C TOTAL SCORE: 0
HOW MANY STANDARD DRINKS CONTAINING ALCOHOL DO YOU HAVE ON A TYPICAL DAY: PATIENT DOES NOT DRINK
PRESCIPTION_ABUSE_PAST_12_MONTHS: NO
SUBSTANCE_ABUSE_PAST_12_MONTHS: NO
AUDIT-C TOTAL SCORE: 0
SKIP TO QUESTIONS 9-10: 1

## 2025-04-07 ASSESSMENT — COGNITIVE AND FUNCTIONAL STATUS - GENERAL
DRESSING REGULAR UPPER BODY CLOTHING: A LITTLE
MOVING FROM LYING ON BACK TO SITTING ON SIDE OF FLAT BED WITH BEDRAILS: TOTAL
MOBILITY SCORE: 17
PATIENT BASELINE BEDBOUND: NO
MOVING TO AND FROM BED TO CHAIR: A LITTLE
DRESSING REGULAR UPPER BODY CLOTHING: A LITTLE
HELP NEEDED FOR BATHING: A LITTLE
DRESSING REGULAR LOWER BODY CLOTHING: A LOT
HELP NEEDED FOR BATHING: A LOT
CLIMB 3 TO 5 STEPS WITH RAILING: A LOT
TURNING FROM BACK TO SIDE WHILE IN FLAT BAD: TOTAL
WALKING IN HOSPITAL ROOM: A LOT
STANDING UP FROM CHAIR USING ARMS: A LITTLE
TOILETING: A LOT
DAILY ACTIVITIY SCORE: 18
CLIMB 3 TO 5 STEPS WITH RAILING: TOTAL
MOVING FROM LYING ON BACK TO SITTING ON SIDE OF FLAT BED WITH BEDRAILS: A LITTLE
MOBILITY SCORE: 17
MOBILITY SCORE: 12
DAILY ACTIVITIY SCORE: 16
STANDING UP FROM CHAIR USING ARMS: A LOT
DRESSING REGULAR LOWER BODY CLOTHING: A LITTLE
WALKING IN HOSPITAL ROOM: A LOT
PERSONAL GROOMING: A LITTLE
DAILY ACTIVITIY SCORE: 18
TURNING FROM BACK TO SIDE WHILE IN FLAT BAD: A LITTLE
CLIMB 3 TO 5 STEPS WITH RAILING: A LOT
STANDING UP FROM CHAIR USING ARMS: A LITTLE
DRESSING REGULAR UPPER BODY CLOTHING: A LITTLE
TOILETING: A LOT
PERSONAL GROOMING: A LITTLE
WALKING IN HOSPITAL ROOM: A LOT
DRESSING REGULAR UPPER BODY CLOTHING: A LITTLE
MOVING TO AND FROM BED TO CHAIR: A LITTLE
WALKING IN HOSPITAL ROOM: A LOT
TOILETING: A LITTLE
MOBILITY SCORE: 17
STANDING UP FROM CHAIR USING ARMS: A LITTLE
DRESSING REGULAR UPPER BODY CLOTHING: A LITTLE
HELP NEEDED FOR BATHING: A LITTLE
MOBILITY SCORE: 14
DAILY ACTIVITIY SCORE: 20
PERSONAL GROOMING: A LITTLE
MOVING TO AND FROM BED TO CHAIR: A LITTLE
MOVING TO AND FROM BED TO CHAIR: A LITTLE
TOILETING: A LOT
WALKING IN HOSPITAL ROOM: A LITTLE
HELP NEEDED FOR BATHING: A LITTLE
TURNING FROM BACK TO SIDE WHILE IN FLAT BAD: A LITTLE
TURNING FROM BACK TO SIDE WHILE IN FLAT BAD: A LITTLE
CLIMB 3 TO 5 STEPS WITH RAILING: A LOT
PERSONAL GROOMING: A LITTLE
DRESSING REGULAR LOWER BODY CLOTHING: A LITTLE
DRESSING REGULAR LOWER BODY CLOTHING: A LITTLE
CLIMB 3 TO 5 STEPS WITH RAILING: A LOT
TOILETING: A LOT
TURNING FROM BACK TO SIDE WHILE IN FLAT BAD: A LITTLE
HELP NEEDED FOR BATHING: A LITTLE
MOVING TO AND FROM BED TO CHAIR: A LOT
DRESSING REGULAR LOWER BODY CLOTHING: A LITTLE
DAILY ACTIVITIY SCORE: 18
STANDING UP FROM CHAIR USING ARMS: A LITTLE

## 2025-04-07 ASSESSMENT — PAIN - FUNCTIONAL ASSESSMENT
PAIN_FUNCTIONAL_ASSESSMENT: 0-10

## 2025-04-07 ASSESSMENT — PAIN SCALES - GENERAL
PAINLEVEL_OUTOF10: 10 - WORST POSSIBLE PAIN
PAINLEVEL_OUTOF10: 0 - NO PAIN
PAINLEVEL_OUTOF10: 6
PAINLEVEL_OUTOF10: 0 - NO PAIN
PAINLEVEL_OUTOF10: 10 - WORST POSSIBLE PAIN

## 2025-04-07 ASSESSMENT — ACTIVITIES OF DAILY LIVING (ADL)
TOILETING: NEEDS ASSISTANCE
HEARING - LEFT EAR: FUNCTIONAL
HEARING - RIGHT EAR: FUNCTIONAL
PATIENT'S MEMORY ADEQUATE TO SAFELY COMPLETE DAILY ACTIVITIES?: YES
ADL_ASSISTANCE: INDEPENDENT
LACK_OF_TRANSPORTATION: YES
ADEQUATE_TO_COMPLETE_ADL: YES
BATHING: INDEPENDENT
WALKS IN HOME: INDEPENDENT
ASSISTIVE_DEVICE: WALKER
LACK_OF_TRANSPORTATION: YES
ADL_ASSISTANCE: INDEPENDENT
BATHING_ASSISTANCE: MODERATE
HOME_MANAGEMENT_TIME_ENTRY: 9
FEEDING YOURSELF: INDEPENDENT
DRESSING YOURSELF: INDEPENDENT
JUDGMENT_ADEQUATE_SAFELY_COMPLETE_DAILY_ACTIVITIES: YES
LACK_OF_TRANSPORTATION: YES
GROOMING: INDEPENDENT

## 2025-04-07 ASSESSMENT — PAIN DESCRIPTION - ORIENTATION: ORIENTATION: LOWER

## 2025-04-07 ASSESSMENT — PATIENT HEALTH QUESTIONNAIRE - PHQ9
2. FEELING DOWN, DEPRESSED OR HOPELESS: NOT AT ALL
1. LITTLE INTEREST OR PLEASURE IN DOING THINGS: NOT AT ALL
SUM OF ALL RESPONSES TO PHQ9 QUESTIONS 1 & 2: 0

## 2025-04-07 ASSESSMENT — PAIN DESCRIPTION - LOCATION: LOCATION: BACK

## 2025-04-07 NOTE — PROGRESS NOTES
Maribeth Nam is a 49 y.o. female on day 1 of admission presenting with Cerebrovascular accident (CVA), unspecified mechanism (Multi).      Subjective   Still having numbness in both lower extremity right greater than left.  Complaining about right knee fluid buildup and she cannot have it drained up until July.  Also weak in both right upper and lower extremity.       Objective     Last Recorded Vitals  /69   Pulse 73   Temp 35.4 °C (95.7 °F)   Resp 20   Wt 119 kg (262 lb 5.6 oz)   SpO2 95%   Intake/Output last 3 Shifts:    Intake/Output Summary (Last 24 hours) at 4/7/2025 1018  Last data filed at 4/7/2025 0600  Gross per 24 hour   Intake --   Output 1800 ml   Net -1800 ml       Admission Weight  Weight: 119 kg (262 lb 5.6 oz) (04/06/25 1152)    Daily Weight  04/07/25 : 119 kg (262 lb 5.6 oz)      Physical Exam:  General: Not in acute distress, alert  HEENT: PERRLA, head intact and normocephalic  Neck: Normal to inspection  Lungs: Clear to auscultation, work of breathing within normal limit  Cardiac: Regular rate and rhythm  Abdomen: Soft nontender, positive bowel sounds  : Exam deferred  Skin: Intact  Hematology: No petechia or excessive ecchymosis  Musculoskeletal: Without significant trauma  Neurological: Alert awake oriented, numbness and both right upper and lower extremity with difficult weakness compared to left side.  No facial droop noted.  Psych: No suicidal ideation or homicidal ideation    Relevant Results  Scheduled medications  atorvastatin, 40 mg, oral, Nightly  fluticasone furoate-vilanteroL, 1 puff, inhalation, Daily  gabapentin, 800 mg, oral, TID  hydrOXYzine HCL, 50 mg, oral, TID  insulin lispro, 0-10 Units, subcutaneous, Before meals & nightly  LORazepam, 1 mg, intravenous, Once  perflutren protein A microsphere, 0.5 mL, intravenous, Once in imaging  rOPINIRole, 4 mg, oral, Nightly  topiramate, 75 mg, oral, BID  traZODone, 100 mg, oral, Nightly      Continuous medications      PRN medications  PRN medications: ALPRAZolam, butalbital-acetaminophen-caff, dextrose, dextrose, glucagon, glucagon, hydrALAZINE, ipratropium-albuteroL, labetaloL   Labs  Results from last 7 days   Lab Units 04/07/25  0441 04/06/25  1155   WBC AUTO x10*3/uL 8.5 9.4   HEMOGLOBIN g/dL 14.1 14.7   HEMATOCRIT % 43.8 44.4   PLATELETS AUTO x10*3/uL 225 235     Results from last 7 days   Lab Units 04/07/25  0441 04/06/25  1155   SODIUM mmol/L 142 138   POTASSIUM mmol/L 3.9 3.0*   CHLORIDE mmol/L 110* 103   CO2 mmol/L 26 24   BUN mg/dL 8 5*   CREATININE mg/dL 0.61 0.62   CALCIUM mg/dL 9.3 9.2   PROTEIN TOTAL g/dL  --  6.0*   BILIRUBIN TOTAL mg/dL  --  0.4   ALK PHOS U/L  --  85   ALT U/L  --  6*   AST U/L  --  6*   GLUCOSE mg/dL 122* 200*       Imaging  CT head wo IV contrast    Result Date: 4/6/2025  No evidence of acute cortical infarct or intracranial hemorrhage.   No evidence of intracranial hemorrhage or displaced skull fracture.   MACRO: None.   Signed by: Beti Van 4/6/2025 3:14 PM Dictation workstation:   [id number]    XR chest 1 view    Result Date: 4/6/2025  No evidence of acute cardiopulmonary process.   Signed by: Laura Pineda 4/6/2025 12:37 PM Dictation workstation:   [id number]    CT brain attack angio head and neck W and WO IV contrast    Result Date: 4/6/2025  No evidence for significant stenosis or large branch vessel cutoffs of the major intracranial or extracranial arterial vasculature. MRI would be more sensitive and specific for recent ischemia if clinically suspected.   MACRO: None   Signed by: Ezio Mills 4/6/2025 12:02 PM Dictation workstation:   [id number]    CT brain attack head wo IV contrast    Result Date: 4/6/2025  No evidence of acute cortical infarct or intracranial hemorrhage.   Minimal mucosal thickening paranasal sinuses.   MACRO: Juliano Hamilton discussed the significance and urgency of this critical finding by secure chat with  MARTY LEJEUNE on 4/6/2025 at 11:40 am.   (**-RCF-**) Findings:  See findings.     Signed by: Juliano Hamilton 4/6/2025 11:40 AM Dictation workstation:   [id number]     Cardiology, Vascular, and Other Imaging  ECG 12 Lead    Result Date: 4/7/2025  Sinus rhythm Borderline short CO interval Borderline T wave abnormalities                     Assessment/Plan   Maribeth Nam is a 49 y.o. female with past medical history of COPD, T2DM, migraines, epilepsy, RLS, obesity presenting with left facial droop and right-sided weakness and was given TNK and admitted to ICU for close observation  Assessment & Plan  Cerebrovascular accident (CVA), unspecified mechanism (Multi)    Acute right-sided weakness with left facial droop and paresthesia, status post TNK on 4/6/2025  MRI of the brain today  Premedication due to claustrophobia  Close monitoring  Appreciate neurology input  PT OT evaluation  Having significant right upper and lower extremity weakness  Recommended potentially needing acute rehab but patient states that 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 All efforts are made to minimize errors, if there are errors there due to transcription.    Ignacio Leary  Hospitalist

## 2025-04-07 NOTE — PROGRESS NOTES
Critical Care Daily Progress Notes        Subjective   Patient is a 49 y.o. female admitted on 4/6/2025 11:25 AM with the following indication(s) for ICU care: post TNK.     Interval History:     -poor sleep  -at this AM  -NIH 3  -awaiting MRI    Scheduled Medications:   atorvastatin, 40 mg, oral, Nightly  fluticasone furoate-vilanteroL, 1 puff, inhalation, Daily  gabapentin, 800 mg, oral, TID  hydrOXYzine HCL, 50 mg, oral, TID  insulin lispro, 0-10 Units, subcutaneous, Before meals & nightly  LORazepam, 1 mg, intravenous, Once  perflutren protein A microsphere, 0.5 mL, intravenous, Once in imaging  rOPINIRole, 4 mg, oral, Nightly  topiramate, 75 mg, oral, BID  traZODone, 100 mg, oral, Nightly         Continuous Medications:         PRN Medications:   PRN medications: ALPRAZolam, butalbital-acetaminophen-caff, dextrose, dextrose, glucagon, glucagon, hydrALAZINE, ipratropium-albuteroL, labetaloL    Objective   Vitals:  Most Recent:  Vitals:    04/07/25 1000   BP:    Pulse:    Resp:    Temp:    SpO2: 95%       24hr Min/Max:  Temp  Min: 35.4 °C (95.7 °F)  Max: 36.8 °C (98.3 °F)  Pulse  Min: 71  Max: 90  BP  Min: 93/49  Max: 139/106  Resp  Min: 12  Max: 35  SpO2  Min: 93 %  Max: 99 %    LDA:  External Urinary Catheter (Active)   Placement Date/Time: 04/06/25 2100   Hand Hygiene Completed: Yes   Number of days: 0         Vent settings:       Hemodynamic parameters for last 24 hours:       I/O:    Intake/Output Summary (Last 24 hours) at 4/7/2025 1043  Last data filed at 4/7/2025 0600  Gross per 24 hour   Intake --   Output 1800 ml   Net -1800 ml       Physical Exam:   Physical Exam  Vitals and nursing note reviewed.   Constitutional:       Appearance: Normal appearance.   HENT:      Head: Normocephalic and atraumatic.      Mouth/Throat:      Mouth: Mucous membranes are dry.   Eyes:      Extraocular Movements: Extraocular movements intact.      Pupils: Pupils are equal, round, and reactive to light.   Cardiovascular:       Rate and Rhythm: Normal rate and regular rhythm.   Pulmonary:      Effort: Pulmonary effort is normal.      Breath sounds: Normal breath sounds.   Abdominal:      Palpations: Abdomen is soft.   Musculoskeletal:         General: No swelling.   Skin:     General: Skin is dry.      Capillary Refill: Capillary refill takes 2 to 3 seconds.   Neurological:      Mental Status: She is alert and oriented to person, place, and time.      Motor: Weakness present.          Lab/Radiology/Diagnostic Review:  Results for orders placed or performed during the hospital encounter of 04/06/25 (from the past 24 hours)   POCT GLUCOSE   Result Value Ref Range    POCT Glucose 194 (H) 74 - 99 mg/dL   Troponin I, High Sensitivity   Result Value Ref Range    Troponin I, High Sensitivity 3 0 - 13 ng/L   Comprehensive metabolic panel   Result Value Ref Range    Glucose 200 (H) 74 - 99 mg/dL    Sodium 138 136 - 145 mmol/L    Potassium 3.0 (L) 3.5 - 5.3 mmol/L    Chloride 103 98 - 107 mmol/L    Bicarbonate 24 21 - 32 mmol/L    Anion Gap 14 10 - 20 mmol/L    Urea Nitrogen 5 (L) 6 - 23 mg/dL    Creatinine 0.62 0.50 - 1.05 mg/dL    eGFR >90 >60 mL/min/1.73m*2    Calcium 9.2 8.6 - 10.3 mg/dL    Albumin 3.5 3.4 - 5.0 g/dL    Alkaline Phosphatase 85 33 - 110 U/L    Total Protein 6.0 (L) 6.4 - 8.2 g/dL    AST 6 (L) 9 - 39 U/L    Bilirubin, Total 0.4 0.0 - 1.2 mg/dL    ALT 6 (L) 7 - 45 U/L   CBC and Auto Differential   Result Value Ref Range    WBC 9.4 4.4 - 11.3 x10*3/uL    nRBC 0.0 0.0 - 0.0 /100 WBCs    RBC 5.27 (H) 4.00 - 5.20 x10*6/uL    Hemoglobin 14.7 12.0 - 16.0 g/dL    Hematocrit 44.4 36.0 - 46.0 %    MCV 84 80 - 100 fL    MCH 27.9 26.0 - 34.0 pg    MCHC 33.1 32.0 - 36.0 g/dL    RDW 14.2 11.5 - 14.5 %    Platelets 235 150 - 450 x10*3/uL    Neutrophils % 74.8 40.0 - 80.0 %    Immature Granulocytes %, Automated 0.7 0.0 - 0.9 %    Lymphocytes % 14.3 13.0 - 44.0 %    Monocytes % 7.9 2.0 - 10.0 %    Eosinophils % 1.9 0.0 - 6.0 %    Basophils % 0.4  0.0 - 2.0 %    Neutrophils Absolute 6.99 1.20 - 7.70 x10*3/uL    Immature Granulocytes Absolute, Automated 0.07 0.00 - 0.70 x10*3/uL    Lymphocytes Absolute 1.34 1.20 - 4.80 x10*3/uL    Monocytes Absolute 0.74 0.10 - 1.00 x10*3/uL    Eosinophils Absolute 0.18 0.00 - 0.70 x10*3/uL    Basophils Absolute 0.04 0.00 - 0.10 x10*3/uL   Magnesium   Result Value Ref Range    Magnesium 1.70 1.60 - 2.40 mg/dL   Protime-INR   Result Value Ref Range    Protime 12.4 9.8 - 12.4 seconds    INR 1.1 0.9 - 1.1   Phosphorus   Result Value Ref Range    Phosphorus 2.8 2.5 - 4.9 mg/dL   ECG 12 Lead   Result Value Ref Range    Ventricular Rate 86 BPM    Atrial Rate 87 BPM    UT Interval 117 ms    QRS Duration 97 ms    QT Interval 360 ms    QTC Calculation(Bazett) 431 ms    P Axis 47 degrees    R Axis 47 degrees    T Axis 47 degrees    QRS Count 14 beats    Q Onset 252 ms    T Offset 432 ms    QTC Fredericia 406 ms   POCT GLUCOSE   Result Value Ref Range    POCT Glucose 187 (H) 74 - 99 mg/dL   POCT GLUCOSE   Result Value Ref Range    POCT Glucose 194 (H) 74 - 99 mg/dL   Lipid Panel   Result Value Ref Range    Cholesterol 119 0 - 199 mg/dL    HDL-Cholesterol 33.9 mg/dL    Cholesterol/HDL Ratio 3.5     LDL Calculated 58 <=99 mg/dL    VLDL 28 0 - 40 mg/dL    Triglycerides 138 0 - 149 mg/dL    Non HDL Cholesterol 85 0 - 149 mg/dL   Hemoglobin A1C   Result Value Ref Range    Hemoglobin A1C 6.1 (H) See comment %    Estimated Average Glucose 128 Not Established mg/dL   CBC   Result Value Ref Range    WBC 8.5 4.4 - 11.3 x10*3/uL    nRBC 0.0 0.0 - 0.0 /100 WBCs    RBC 5.00 4.00 - 5.20 x10*6/uL    Hemoglobin 14.1 12.0 - 16.0 g/dL    Hematocrit 43.8 36.0 - 46.0 %    MCV 88 80 - 100 fL    MCH 28.2 26.0 - 34.0 pg    MCHC 32.2 32.0 - 36.0 g/dL    RDW 14.5 11.5 - 14.5 %    Platelets 225 150 - 450 x10*3/uL   Basic Metabolic Panel   Result Value Ref Range    Glucose 122 (H) 74 - 99 mg/dL    Sodium 142 136 - 145 mmol/L    Potassium 3.9 3.5 - 5.3 mmol/L     Chloride 110 (H) 98 - 107 mmol/L    Bicarbonate 26 21 - 32 mmol/L    Anion Gap 10 10 - 20 mmol/L    Urea Nitrogen 8 6 - 23 mg/dL    Creatinine 0.61 0.50 - 1.05 mg/dL    eGFR >90 >60 mL/min/1.73m*2    Calcium 9.3 8.6 - 10.3 mg/dL   POCT GLUCOSE   Result Value Ref Range    POCT Glucose 139 (H) 74 - 99 mg/dL   Transthoracic Echo (TTE) Limited   Result Value Ref Range    BSA 2.32 m2     Imaging  CT head wo IV contrast    Result Date: 4/6/2025  No evidence of acute cortical infarct or intracranial hemorrhage.   No evidence of intracranial hemorrhage or displaced skull fracture.   MACRO: None.   Signed by: Beti Van 4/6/2025 3:14 PM Dictation workstation:   [id number]    XR chest 1 view    Result Date: 4/6/2025  No evidence of acute cardiopulmonary process.   Signed by: Laura Pineda 4/6/2025 12:37 PM Dictation workstation:   [id number]    CT brain attack angio head and neck W and WO IV contrast    Result Date: 4/6/2025  No evidence for significant stenosis or large branch vessel cutoffs of the major intracranial or extracranial arterial vasculature. MRI would be more sensitive and specific for recent ischemia if clinically suspected.   MACRO: None   Signed by: Ezio Mills 4/6/2025 12:02 PM Dictation workstation:   [id number]    CT brain attack head wo IV contrast    Result Date: 4/6/2025  No evidence of acute cortical infarct or intracranial hemorrhage.   Minimal mucosal thickening paranasal sinuses.   MACRO: Juliano Hamilton discussed the significance and urgency of this critical finding by secure chat with  MARTY LEJEUNE on 4/6/2025 at 11:40 am.  (**-RCF-**) Findings:  See findings.     Signed by: Juliano Hamilton 4/6/2025 11:40 AM Dictation workstation:   [id number]     Cardiology, Vascular, and Other Imaging  ECG 12 Lead    Result Date: 4/7/2025  Sinus rhythm Borderline short AR interval Borderline T wave abnormalities                      Assessment/Plan   Assessment & Plan  Cerebrovascular accident (CVA),  unspecified mechanism (Multi)    Maribeth Nam is a 49 y.o. female  with PMHx s/f COPD, T2DM, migraines, epilepsy and restless leg syndrome was admitted to the ICU status post TNK for stroke after presenting to ER with left facial droop and right-sided weakness.     Neuro   - Initial concern for CVA    - Status post TNK    - Repeat imaging at noon time (MRI)    - Hemodynamically stable    - NIH of around 3    - Discussed with neurology team   - Chronic migraines, epilepsy, restless leg syndrome    - Resume home meds    Respiratory   - Currently on room air    Cardiac   - Hemodynamically stable    Renal   - Serum creatinine stable at 0.61 mg/dL    GI   - Tolerating p.o. intake       - No Banda catheter    GI DVT prophylax  - PPI and SCDs    Discussed with primary team and neurology team  Can transfer out of ICU after MRI    Cristhian Atwood MD  I spent 35 minutes of cumulative critical care time with the patient.  Greater than 50% of that time was spent in the direct collaboration and or coordination of care of the patient.     Dragon dictation software was used to dictate this note and thus there may be minor errors in translation/transcription including garbled speech or misspellings. Please contact for clarification if needed.

## 2025-04-07 NOTE — CARE PLAN
Problem: Pain - Adult  Goal: Verbalizes/displays adequate comfort level or baseline comfort level  Outcome: Progressing     Problem: Safety - Adult  Goal: Free from fall injury  Outcome: Progressing     Problem: Discharge Planning  Goal: Discharge to home or other facility with appropriate resources  Outcome: Progressing     Problem: Chronic Conditions and Co-morbidities  Goal: Patient's chronic conditions and co-morbidity symptoms are monitored and maintained or improved  Outcome: Progressing     Problem: Nutrition  Goal: Nutrient intake appropriate for maintaining nutritional needs  Outcome: Progressing     Problem: Fall/Injury  Goal: Not fall by end of shift  Outcome: Progressing  Goal: Be free from injury by end of the shift  Outcome: Progressing  Goal: Verbalize understanding of personal risk factors for fall in the hospital  Outcome: Progressing  Goal: Verbalize understanding of risk factor reduction measures to prevent injury from fall in the home  Outcome: Progressing  Goal: Use assistive devices by end of the shift  Outcome: Progressing  Goal: Pace activities to prevent fatigue by end of the shift  Outcome: Progressing     Problem: Pain  Goal: Takes deep breaths with improved pain control throughout the shift  Outcome: Progressing  Goal: Turns in bed with improved pain control throughout the shift  Outcome: Progressing  Goal: Walks with improved pain control throughout the shift  Outcome: Progressing  Goal: Performs ADL's with improved pain control throughout shift  Outcome: Progressing  Goal: Participates in PT with improved pain control throughout the shift  Outcome: Progressing  Goal: Free from opioid side effects throughout the shift  Outcome: Progressing  Goal: Free from acute confusion related to pain meds throughout the shift  Outcome: Progressing     The clinical goals for the shift include patient will have no change in neuro status throughout shift

## 2025-04-07 NOTE — PROGRESS NOTES
Maribeth Nam is a 49 y.o. female admitted for Cerebrovascular accident (CVA), unspecified mechanism (Multi). Pharmacy reviewed the patient's [id number] medications and allergies for accuracy.    The list below reflects the PTA list prior to pharmacy medication history. A summary a changes to the PTA medication list has been listed below. Please review each medication in order reconciliation for additional clarification and justification.    Source of information: t2p    Medications added:  Advair 230-21mcg- 2 puffs bid   Albuterol nebulizer solution - 3ml q 4 h prn sob/wheezing use first then duo-neb   Duo-neb - 3ml q 4 h prn sob/wheezing. Use albuterol first then duo-neb  Nucala 100mg/ml- 100mg q 4 weeks    Ozempic 2mg/dose- once q 7 days on Thursdays   Pepcid 40mg- 1 qpm   Vistaril 50mg - 1 tid prn anxiety   Remeron 15mg- 1 at bedtime   Protonix 40mg - 1 qam     Medications modified:  Xanax 2mg 1 bid --> 1mg 1 qid prn  Topamax 100mg 1 bid --> 50mg 1 bid   Trazodone 100mg 1 at bedtime --> 3 at bedtime     Medications to be removed:  Breo 100mcg inhaler   Lantus pen   Latuda 80mg   Atarax 50mg     Medications of concern:      Prior to Admission Medications   Prescriptions Last Dose Informant Patient Reported? Taking?   ALPRAZolam (Xanax) 2 mg tablet   Yes No   Sig: Take 1 tablet (2 mg) by mouth 2 times a day as needed.   albuterol 90 mcg/actuation inhaler   Yes No   Sig: Inhale 2 puffs every 4 hours if needed.   fluticasone furoate-vilanteroL (Breo Ellipta) 200-25 mcg/dose inhaler   Yes No   Sig: Inhale 1 puff once daily.   gabapentin (Neurontin) 800 mg tablet   Yes Yes   Sig: Take 1 tablet (800 mg) by mouth 3 times a day.   hydrOXYzine HCL (Atarax) 50 mg tablet   Yes No   Sig: Take 1 tablet (50 mg) by mouth 3 times a day.   insulin glargine (Lantus Solostar U-100 Insulin) 100 unit/mL (3 mL) pen   Yes No   Sig: Inject 60 Units under the skin once daily at bedtime.   lurasidone (Latuda) 80 mg tablet    Yes No   Sig: Take 1 tablet (80 mg) by mouth once daily at bedtime.   promethazine (Phenergan) 25 mg tablet   Yes Yes   Sig: Take 1 tablet (25 mg) by mouth every 6 hours if needed for nausea.   rOPINIRole (Requip) 4 mg tablet   Yes No   Sig: Take 1 tablet (4 mg) by mouth once daily at bedtime.   temazepam (Restoril) 30 mg capsule   Yes Yes   Sig: Take 1 capsule (30 mg) by mouth once daily at bedtime.   topiramate (Topamax) 100 mg tablet   Yes No   Sig: Take 1 tablet (100 mg) by mouth 2 times a day.   traZODone (Desyrel) 100 mg tablet   Yes No   Sig: Take 1 tablet (100 mg) by mouth once daily at bedtime.      Facility-Administered Medications: None       FRANK LLODY

## 2025-04-07 NOTE — PROGRESS NOTES
Occupational Therapy    Evaluation    Patient Name: Maribeth Nam  MRN: [id number]  Department: [address]  Room: [facility]  Today's Date: 4/7/2025  Time Calculation  Start Time: 1352  Stop Time: 1421  Time Calculation (min): 29 min        Assessment:  OT Assessment: OT assessment completed. Pt with increased falls risk, new onset numbness/tingling; decreased mobility impacting ADL performance and independence and would benefit from high level/high intensity OT intervention.  Prognosis: Good  Barriers to Discharge Home: Caregiver assistance  Caregiver Assistance: Caregiver assistance needed per identified barriers - however, level of patient's required assistance exceeds assistance available at home  Evaluation/Treatment Tolerance: Patient limited by fatigue  Medical Staff Made Aware: Yes  End of Session Communication: Bedside nurse  End of Session Patient Position: Bed, 1 rail up, Alarm on (table tray in place and all needs within reach)  OT Assessment Results: Decreased ADL status, Decreased functional mobility  Prognosis: Good  Barriers to Discharge: Decreased caregiver support  Evaluation/Treatment Tolerance: Patient limited by fatigue  Medical Staff Made Aware: Yes  Strengths: Housing layout, Ability to acquire knowledge, Support of Caregivers  Barriers to Participation: Comorbidities  Plan:  Treatment Interventions: ADL retraining, Functional transfer training, UE strengthening/ROM, Endurance training, Patient/family training, Equipment evaluation/education, Neuromuscular reeducation, Compensatory technique education  OT Frequency: 3 times per week  OT Discharge Recommendations: High intensity level of continued care  OT Recommended Transfer Status: Assist of 1  OT - OK to Discharge: Yes  Treatment Interventions: ADL retraining, Functional transfer training, UE strengthening/ROM, Endurance training, Patient/family training, Equipment evaluation/education, Neuromuscular reeducation, Compensatory technique  education    Subjective   Current Problem:  1. Cerebrovascular accident (CVA), unspecified mechanism (Multi)  Transthoracic Echo (TTE) Limited    Transthoracic Echo (TTE) Limited      2. Other cerebrovascular disease  Transthoracic Echo (TTE) Limited    Transthoracic Echo (TTE) Limited        General:  General  Reason for Referral: OT for ADL assessment  Referred By: Ignacio Leary MD  Past Medical History Relevant to Rehab: 49 YOF presented from community with report of L facial droop and RUE/RLE weakness. Admitted for stroke-like symptoms (Hx:anxiety; TIAs, COPD, DM and seizures)  Family/Caregiver Present: No  Prior to Session Communication: Bedside nurse (via Secure Chat prior to session)  Patient Position Received: Bed, 2 rail up, Alarm on (Pt sitting with HOB elevated and appeared to be using her Smartphone with bilateral UEs. ICU monitoring,)  Preferred Learning Style: verbal, visual  General Comment: OT orders received and chart reviewed. Pt educated on reason for OT consultation and acute services. Pt agreeable to participate.  Precautions:  Hearing/Visual Limitations: appear WFL  UE Weight Bearing Status: Weight Bearing as Tolerated  LE Weight Bearing Status: Weight Bearing as Tolerated  Medical Precautions: Fall precautions     Date/Time Vitals Session Patient Position Pulse Resp SpO2 BP MAP (mmHg)    04/07/25 1500 --  --  --  --  --  104/81  87     04/07/25 1603 --  --  85  16  92 %  125/83  97                 Pain:  Pain Assessment  Pain Assessment: 0-10  0-10 (Numeric) Pain Score: 0 - No pain (Pt indicates regular R knee pain and headaches.)    Objective   Cognition:  Overall Cognitive Status: Within Functional Limits  Orientation Level: Disoriented X4  Attention:  (appeared to be selective; pt seemed distracted by content on her phone vs actively participating in conversation with therpist)  Memory: Within Funtional Limits  Safety/Judgement: Within Functional Limits  Insight: Mild (pt minimizing need to  high intensity rehab to return to previous level of function and expressing want to be home. Pt discussed her diabetes history however ordered sugary drink, high carb food and dessert for lunch.)  Impulsive:  (not noted)  Processing Speed: Delayed (Pt required repetition; may be related to decreased attention to task. Nursing indicated pt received 2 doses of Ativan for MRI to be completed)     Confusion Assessment Method (CAM)  Acute Onset and Fluctuating Course (1A): No  Harris Agitation Sedation Scale  Harris Agitation Sedation Scale (RASS): Drowsy  Home Living:  Type of Home: Apartment  Lives With:  (pt and son share an apartment; pt ex-spouse lives 2 floors above in same apt building)  Home Adaptive Equipment:  (rollator)  Home Layout: One level  Home Access: Level entry, Elevator  Bathroom Shower/Tub: Tub/shower unit  Bathroom Toilet: Standard  Bathroom Equipment: Grab bars in shower, Shower chair with back  Home Living Comments: pt indicates fall concerns with transfering in/out of tub. Pt educated on tub bench to reduce fall risk.  Prior Function:  Level of [place]: Independent with ADLs and functional transfers, Independent with homemaking with ambulation  ADL Assistance: Independent  Homemaking Assistance: Independent  Ambulatory Assistance: Independent  Hand Dominance: Right     ADL:  Eating Assistance: Independent  Eating Deficit:  (Pt received lunch tray during assessment; pt demonstrated ability to open containers and manage utensils)  Grooming Assistance: Independent  Grooming Deficit:  (anticipate; increased time possible; pt with full ROM BUE however with some numbness in R hand reported.  Pt educated on use of mirror for visual cue regarding washing/rinsing)  Bathing Assistance: Moderate  Bathing Deficit:  (anticipated for safety due to pt with decreased OOB tolerance; pt drowsy from sedation medication for MRI and declines sitting up in chair.  Pt reporting R knee swollen more than usual  limiting full use/ mobility of RLE)  UE Dressing Assistance: Independent  UE Dressing Deficit: Increased time to complete  LE Dressing Assistance: Moderate  LE Dressing Deficit: Setup, Steadying, Increased time to complete, Don/doff R sock, Thread RLE into pants, Pull up over hips  Toileting Assistance with Device: Moderate  Toileting Deficit: Steadying, Supervison/safety, Increased time to complete, Grab bar use, Clothing management up, Clothing management down  Functional Assistance: Maximal  Functional Deficit: Increased time to complete  Activity Tolerance:  Endurance: Tolerates 10 - 20 min exercise with multiple rests  Early Mobility/Exercise Safety Screen: Proceed with mobilization - No exclusion criteria met  Bed Mobility/Transfers: Bed Mobility  Bed Mobility: Yes  Bed Mobility 1  Bed Mobility 1: Rolling right, Rolling left, Supine to sitting, Sitting to supine  Level of Assistance 1: Moderate assistance, Minimal verbal cues  Bed Mobility Comments 1: increased time; verbal cues    Transfers  Transfer: No (Pt appeared drowsy with decreased attention to task; nursing confirmed pt had 2 doses of Ativan for MRI; increased risk of falls at this time. OOB activity deferred)      Functional Mobility:  Functional Mobility  Functional Mobility Performed: No (Pt appeared drowsy with decreased attention to task; nursing confirmed pt had 2 doses of Ativan for MRI; increased risk of falls at this time. OOB activity deferred. Nrsg reports pt ambulated with walker and assistance to bathroom prior to MRI with mod A)  Sitting Balance:  Static Sitting Balance  Static Sitting-Balance Support: Feet supported, Bilateral upper extremity supported  Static Sitting-Level of Assistance: Contact guard  Dynamic Sitting Balance  Dynamic Sitting-Balance Support: Feet supported  Dynamic Sitting-Level of Assistance: Minimum assistance  Dynamic Sitting-Balance: Reaching for objects, Reaching for weighted objects, Trunk control activities       Modalities:  Modalities Used: No  Vision:Vision - Basic Assessment  Current Vision: No visual deficits  Sensation:  Sensation Comment:  (Pt reports mild numbness/tingling throughout RUE/RLE)  Strength:  Strength Comments: BUE appear WFL  Perception:  Inattention/Neglect: Appears intact  Initiation: Appears intact  Motor Planning: Appears intact  Perseveration: Not present  Coordination:  Movements are Fluid and Coordinated: Yes  Finger to Nose:  (delayed; mild impairment noted when asked to complete task)  Rapid Alternating Movements: Intact  Finger to Target:  (mild errors made with R hand; pt able to use B hands to operate smart phone and create texts)   Hand Function:  Gross Grasp: Functional  Coordination: Functional  Extremities: RUE   RUE : Within Functional Limits and LUE   LUE: Within Functional Limits        Outcome Measures:[facility] Daily Activity  Putting on and taking off regular lower body clothing: A lot  Bathing (including washing, rinsing, drying): A lot  Putting on and taking off regular upper body clothing: A little  Toileting, which includes using toilet, bedpan or urinal: A lot  Taking care of personal grooming such as brushing teeth: A little  Eating Meals: None  Daily Activity - Total Score: 16      Goals:  Encounter Problems       Encounter Problems (Active)       ADLs       Patient with complete upper body dressing with independent level of assistance donning and doffing all UE clothes with no adaptive equipment while standing       Start:  04/07/25    Expected End:  04/21/25            Patient with complete lower body dressing with independent level of assistance donning and doffing all LE clothes  with PRN adaptive equipment while edge of bed  and standing       Start:  04/07/25    Expected End:  04/21/25            Patient will complete daily grooming tasks brushing teeth and washing face/hair with independent level of assistance and PRN adaptive equipment while standing.       Start:   04/07/25    Expected End:  04/21/25            Patient will complete toileting including hygiene clothing management/hygiene with independent level of assistance and grab bars.       Start:  04/07/25    Expected End:  04/21/25               BALANCE       Pt will maintain dynamic standing balance during ADL task with independent level of assistance in order to demonstrate decreased risk of falling and improved postural control.       Start:  04/07/25    Expected End:  04/21/25               EXERCISE/STRENGTHENING       Patient will be educated on BUE HEP for increased ADL performance.       Start:  04/07/25    Expected End:  04/21/25               MOBILITY       Patient will perform Functional mobility no external Household distances/Community Distances with independent level of assistance and least restrictive device in order to improve safety and functional mobility.       Start:  04/07/25    Expected End:  04/21/25               TRANSFERS       Patient will complete functional transfer to all surfaces with least restrictive device with independent level of assistance.       Start:  04/07/25    Expected End:  04/21/25

## 2025-04-07 NOTE — PROGRESS NOTES
04/07/25 1318   Discharge Planning   Living Arrangements Children   Support Systems Children   Assistance Needed Independent with rollator   Type of Residence Private residence   Home or Post Acute Services In home services   Type of Home Care Services Home nursing visits;Home OT;Home PT   Expected Discharge Disposition SNF   Does the patient need discharge transport arranged? Yes   RoundTrip coordination needed? Yes   Has discharge transport been arranged? No   Financial Resource Strain   How hard is it for you to pay for the very basics like food, housing, medical care, and heating? Hard   Housing Stability   In the last 12 months, was there a time when you were not able to pay the mortgage or rent on time? N   Transportation Needs   In the past 12 months, has lack of transportation kept you from medical appointments or from getting medications? yes   In the past 12 months, has lack of transportation kept you from meetings, work, or from getting things needed for daily living? Yes     PCP is Elisabeth Skaggs MD. Patient is from home with her son and her boyfriend at times. She states that approximately 1 year ago she had a knee replacement, before the replacement she was independent with her rollator. Since the replacement she has been having increased weakness and pain and occasionally needing 1 assist with ambulation. She has been going to Outpatient PT/OT which she does not think has been helping. She was supposed to have [facility] start today, for 2x per week for 3 hours per day. She is independent with showering, cooking and cleaning. Patient uses [employer] for transportation but states it is usually very difficult to get on with her rollator and her son. Her son is actually her Great Nephew which she has split custody with another woman named Surekha. She expressed concerns regarding child's safety when he is with Surekha. Consulted with JANETTE Gaviria. PT/ OT pending. Patient is interested in HHC if recommended. She is NOT  interested in SNF placement at this time. She is requesting a BSC and a Rollator on discharge. She denies any other home going needs at this time. [facility] will continue to follow for needs if they arise.

## 2025-04-07 NOTE — PROGRESS NOTES
"[facility] Progress Note    Maribeth Nam is a 49 y.o. female on day 1 of admission presenting with Cerebrovascular accident (CVA), unspecified mechanism (Multi).  Subjective   Pt seen and examined by myself for first time today. Initially seen by Dr. Zimmerman. Pt resting in bed in ICU. No family present at time of evaluation. Pt states her symptoms are improving since admission but states she still cannot lift her RLE at all and sensation is slightly off to it still. Denies any sensory issues to face or other extremities. States at times she does note that her \"face is pulled to the side.\" Of note no asymmetry at rest and both sides of her face move equally throughout conversation and visit but when asked to smile, face is drawn up to left side. Patient states she is noting stuttering to her speech today. This is only noted when she expressly is talking about this. No other stuttering noted.     Pt c/o constant HA at 10/10. States nothing has helped it. Does later state that Fiorcet has historically been the only thing that has helped it and that she is getting it here and it works \"after 2 hours, my migraine is gone.\" Denies any change to migraine with increase of topiramate. States \"I am not on it for my migraines, I am on it for seizures.\" When explained that it can be used to treat both, she states only the Fiorcet helps her migraines \"but my doctor won't keep giving it to me because of the YARELIS stuff.\" Pt asks \"will I go home with fiorcet?\". Explained that we could give a temporary fill but she would need a provider to continue it because it is controlled substance and requires monitoring. She states \"that is ok, if you give me some of it, then my doctor will do all of the monitoring and stuff and keep me on it.\"    Asking for \"something for the shaking.\" When discussing possible propranolol for dual benefit to migraines and essential tremor. Pt states \"I don't want it for my migraines. Can't you just " "give me a pill for the shaking?\" Her BP this morning has been low 100s systolic. Pt states \"Oh yeah it was less than 100 last night.\"     Pt also states she was previously taken out of PT as it \"made her knee worse\" so she is not sure about continuing PT/OT recommendations here.        Objective     Vitals:    04/07/25 1100 04/07/25 1200 04/07/25 1400 04/07/25 1500   BP:  114/69 123/83 104/81   Pulse:  84 71    Resp:  (!) 30 18    Temp: 36.4 °C (97.6 °F)      TempSrc:       SpO2:  98% 95%    Weight:       Height:             Physical Exam  Vitals reviewed.   Eyes:      General: Lids are normal.      Extraocular Movements: Extraocular movements intact.   Neurological:      Deep Tendon Reflexes:      Reflex Scores:       Bicep reflexes are 2+ on the right side and 2+ on the left side.       Brachioradialis reflexes are 2+ on the right side and 2+ on the left side.       Patellar reflexes are 1+ on the left side.       Achilles reflexes are 1+ on the right side and 1+ on the left side.  Psychiatric:         Speech: Speech normal.       Neurological Exam  Mental Status  Awake, alert and oriented to person, place and time. Speech is normal. Language is fluent with no aphasia. Attention and concentration are normal. Fund of knowledge is appropriate for level of education.    Cranial Nerves  CN II: Right funduscopic exam: not visualized. Left funduscopic exam: not visualized. Grossly intact.  CN III, IV, VI: Extraocular movements intact bilaterally. Normal lids and orbits bilaterally.   Right pupil: 3 mm. Round. Reactive to light.   Left pupil: Dilated. Round. Abnormal reactivity: No light reaction.  CN V: Facial sensation is normal.  CN VII: No asymmetry at rest or during conversation. When asked to smile, does draw face up to L.  CN VIII: Hearing is normal.  CN IX, X: Palate elevates symmetrically  CN XI: Shoulder shrug strength is normal.  CN XII: Tongue midline without atrophy or fasciculations.    Motor  Normal muscle " bulk throughout. No fasciculations present. Normal muscle tone. The following abnormal movements were seen: Fine action tremor to BUE, action > sustenstion > resting.    Strength intact throughout except pt states unable to lift RLE off bed without using her hands to do so. There is flicker of movement with distal RLE ROMs .    Sensory  Light touch abnormality: States 'more tingly' to RLE.     Reflexes                                            Right                      Left  Brachioradialis                    2+                         2+  Biceps                                 2+                         2+  Patellar                                                        1+  Achilles                                1+                         1+  R knee reflex not tested at pt request.      Scheduled medications  atorvastatin, 40 mg, oral, Nightly  famotidine, 40 mg, oral, q PM  fluticasone furoate-vilanteroL, 1 puff, inhalation, Daily  gabapentin, 800 mg, oral, TID  hydrOXYzine HCL, 50 mg, oral, TID  insulin lispro, 0-10 Units, subcutaneous, Before meals & nightly  perflutren protein A microsphere, 0.5 mL, intravenous, Once in imaging  rOPINIRole, 4 mg, oral, Nightly  topiramate, 75 mg, oral, BID  traZODone, 100 mg, oral, Nightly      Continuous medications     PRN medications  PRN medications: ALPRAZolam, butalbital-acetaminophen-caff, dextrose, dextrose, glucagon, glucagon, hydrALAZINE, ipratropium-albuteroL, labetaloL     Lab Results   Component Value Date    WBC 8.5 04/07/2025    RBC 5.00 04/07/2025    HGB 14.1 04/07/2025    HCT 43.8 04/07/2025     04/07/2025     04/07/2025    K 3.9 04/07/2025     (H) 04/07/2025    BUN 8 04/07/2025    CREATININE 0.61 04/07/2025    EGFR >90 04/07/2025    CALCIUM 9.3 04/07/2025    ALKPHOS 85 04/06/2025    AST 6 (L) 04/06/2025    ALT 6 (L) 04/06/2025    MG 1.70 04/06/2025    HGBA1C 6.1 (H) 04/07/2025    LDLCALC 58 04/07/2025    CHOL 119 04/07/2025    HDL 33.9  04/07/2025    TRIG 138 04/07/2025       Below CT and MRI images and reports have been personally reviewed in PACS, agree with interpretations.    MR brain wo IV contrast 04/07/2025    Narrative  Interpreted By:  Rui Govea,  STUDY:  MR BRAIN WO IV CONTRAST;  4/7/2025 1:20 pm    INDICATION:  Signs/Symptoms:facial droop, R weakness, s/p TNK.      COMPARISON:  CT head yesterday. CTA head yesterday.    ACCESSION NUMBER(S):  [id number]    ORDERING CLINICIAN:  NOAH TORRES    TECHNIQUE:  Axial T2, FLAIR, DWI, gradient echo T2 and sagittal and coronal T1  weighted images of brain were acquired.    FINDINGS:  Brain parenchymal volume is well-maintained. Overall, brain  parenchymal signal characteristics are within normal limits.    No intracranial hemorrhage. No extra-axial fluid collection. No  intracranial mass. No abnormally restricted diffusion to suggest  recent infarction. Major vascular flow voids are present.    Incidental note of slightly expanded partially empty appearing sella.  There is also some tortuosity of the optic nerve sheaths. These  findings are nonspecific but can be seen in the setting of benign  intracranial hypertension.    Ventricles are midline, normal in size and configuration.    No significant paranasal sinus/ethmoid mucosal inflammatory changes.  No nonspecific mastoid fluid. Orbital contents unremarkable. No  aggressive appearing osseous lesions.    Impression  No evidence of acute infarct, intracranial mass effect or midline  shift.    Incidental note of slightly expanded partially empty appearing sella.  There is also some tortuosity of the optic nerve sheaths. These  findings are nonspecific but can be seen in the setting of benign  intracranial hypertension.    MACRO:  None    Signed by: Rui Govea 4/7/2025 1:51 PM  Dictation workstation:   [id number]      CT head wo IV contrast 04/06/2025    Narrative  Interpreted By:  Beti Van,  STUDY:  CT HEAD WO IV CONTRAST;  4/6/2025  2:45 pm    INDICATION:  Signs/Symptoms:headache after TPA.      COMPARISON:  04/06/2025 at 11:34 a.m.    ACCESSION NUMBER(S):  [id number]    ORDERING CLINICIAN:  MARTY LEJEUNE    TECHNIQUE:  Noncontrast axial CT scan of head was performed. Angled reformats in  brain and bone windows and coronal and sagittal reformats in brain  window were generated.    FINDINGS:  CSF Spaces: The ventricles, sulci and basal cisterns are within  normal limits. There is no extraaxial fluid collection.    Parenchyma:  The grey-white differentiation is intact. There is no  mass effect or midline shift.  There is no intracranial hemorrhage.    Calvarium: The calvarium is unremarkable.    Paranasal sinuses and mastoids: Retention cyst is noted upper portion  of the left maxillary sinus. Patchy mucoperiosteal thickening is  present in ethmoid air cells bilaterally. Mastoid air cells appear  clear.    Impression  No evidence of acute cortical infarct or intracranial hemorrhage.    No evidence of intracranial hemorrhage or displaced skull fracture.    MACRO:  None.    Signed by: Beti Van 4/6/2025 3:14 PM  Dictation workstation:   [id number]      CT brain attack angio head and neck W and WO IV contrast 04/06/2025    Narrative  Interpreted By:  Ezio Mills,  STUDY:  CT BRAIN ATTACK ANGIO HEAD AND NECK W AND WO IV CONTRAST;  4/6/2025  11:43 am    INDICATION:  Signs/Symptoms:cva.      COMPARISON:  04/06/2025 CT head    ACCESSION NUMBER(S):  [id number]    ORDERING CLINICIAN:  MARTY LEJEUNE    TECHNIQUE:  90 ML of Omnipaque 350 was administered intravenously and axial  images of the head and neck were acquired.  Coronal, sagittal, and  3-D reconstructions were provided for review.    FINDINGS:  Findings CT angiography neck:  Origins of the great vessels are grossly unremarkable. Common carotid  arteries carotid bifurcations and cervical internal carotid arteries  are patent without hemodynamically significant stenosis identified.  The  vertebral arteries are patent in the region of the neck. Limited  examination through the lung apices is grossly unremarkable.    Findings CT angiography head:  Distal internal carotid arteries are patent and branch appropriately  into the anterior and middle cerebral arteries without evidence of  hemodynamically significant stenosis or other abnormality identified.  The distal vertebral arteries are patent and join appropriately to  form the basilar artery which gives rise to the right posterior  cerebral artery which is proximally patent. The left posterior  cerebral artery arises at least principally via a prominent left  posterior communicating artery with diminutive appearance of the  right P1 segment. Right posterior communicating artery is not clearly  visualized.    Impression  No evidence for significant stenosis or large branch vessel cutoffs  of the major intracranial or extracranial arterial vasculature. MRI  would be more sensitive and specific for recent ischemia if  clinically suspected.    MACRO:  None    Signed by: Ezio Mills 4/6/2025 12:02 PM  Dictation workstation:   [id number]      NIH Stroke Scale  1A. Level of Consciousness: Alert, Keenly Responsive  1B. Ask Month and Age: Both Questions Right  1C. Blink Eyes & Squeeze Hands: Performs Both Tasks  2. Best Gaze: Normal  3. Visual: No Visual Loss  4. Facial Palsy: Normal Symmetrical Movements  5A. Motor - Left Arm: No Drift  5B. Motor - Right Arm: No Drift  6A. Motor - Left Leg: No Drift  6B. Motor - Right Leg: No Effort Against [place]  7. Limb Ataxia: Absent  8. Sensory Loss: Mild-to-Moderate Sensory Loss  9. Best Language: No Aphasia  10. Dysarthria: Normal  11. Extinction and Inattention: No Abnormality  NIH Stroke Scale: 4          Zoe Coma Scale  Best Eye Response: Spontaneous  Best Verbal Response: Oriented  Best Motor Response: Follows commands  Zoe Coma Scale Score: 15        Assessment/Plan   This patient currently has cardiac  "telemetry ordered; if you would like to modify or discontinue the telemetry order, click here to go to the orders activity to modify/discontinue the order.  Assessment & Plan  Cerebrovascular accident (CVA), unspecified mechanism (Multi)      IMPRESSION:  Maribeth Nam is a 49 y.o. female with history of COPD, DM (resolved per pt), migraines, epilepsy, RLS, smoking, and obesity presented for L face parasthesia, L facial droop, RLE numbness, LLE tingling and then numbness in R face. Reports recent flu like illness with fever up to 104 ~ 4 days PTA. On atorvastatin 40 mg PTA per pt, not on antiplatelet. Had admission Jan 2025 to OSH for L sided weakness, R sided headache, L face/arm/leg tingling, LUE/LLE weakness and visual flashing lights. BP at that time was 176/80 with NIH 7, no stroke on MRI. Restarted on topiramate up to 50 mg BID. Was recommended to be on Plavix. Allergy to aspirin. Also had admission March 2025 for L face/arm/leg tingling as well with no acute findings on CT or CTA head/neck. Pt got IV TNK here in the ED for initial NIH of 9  HCT - no acute findings  CTA head/neck - no significant stenosis or LVO  MRI brain wo contrast- no acute infarct, mass or hemorrhage. Incidental finding of slightly expanded partially empty sella with some tortuosity of the optic nerve sheaths.   Lipid panel with LDL 58, CHOL 119,   A1C 6.1%    Parasthesias and weakness RLE  L face parasthesia, R face numbness, L facial droop, and parasthesia to LLE, resolved  Of note face drawing up to L side is not consistent with facial droop and is opposite side of purported L facial droop leading to this hospitalization  Stuttering, new onset per pt  Non-neurologic finding  Abnormal MRI  S/p TNK  Tremor  Hx of migraines  Attempted to discuss trying to treat for possibility of complicated migraine diagnosis. Pt resistant to discussions. States \"I know when I have a migraine or not.\"   Hx of epilepsy    Constellation of " symptoms is varied but differential diagnoses include acute stroke ABORTED by TNK (though symptoms are still present despite no evidence of stroke on MRI) vs complicated migraine vs ? Lumbar/cervical radiculopathy (though would not explain facial symptoms). Cannot rule out possibility of Functional Neurologic Disorder component.     RECOMMENDATIONS:  Continue supportive care  Can transfer out of ICU  Continue PT/OT  TTE results pending  Continue vascular risk factor monitoring and management  Start Plavix 75 mg daily  Continue home HI statin daily  Continue cardiac telemetry, will order holter on discharge x 14 days.   Continue topiramate 75 mg BID  Continue PRN fiorcet. Can likely discharge with temporary fill of 5 tablets but discussed with patient that she will need to follow up with her care team for continued dosing as this is controlled substance, states understanding  Outpatient ophthalmology evaluation for ? IIH evaluation - I did discuss this with patient.   Defer tremor treatment to outpatient, this is chronic issue. Discussed possibility of trying propranolol long term for possible migraine and tremor dual benefit. Defer adding right now, especially with current issues.   Continue permissive HTN for 24 more hours.   Neuro checks per protocol.   MRI L spine for continued RLE complaints    Discussed with patient, all questions answered.   Case discussed with Dr. Zimmerman and Dr. Atwood with ICU separately.        I personally spent 55 minutes today, exclusive of procedures, providing care for this patient, including preparation, face to face time, documentation and other services such as review of medical records, diagnostic result, patient education, counseling, coordination of care as specified in the encounter.    Eva Rodriguez, APRN-CNP

## 2025-04-07 NOTE — CARE PLAN
The patient's goals for the shift include to do neuro checks    The clinical goals for the shift include to have decreased stroke symptoms    Over the shift, the patient did progress toward the following goals.    Problem: Pain - Adult  Goal: Verbalizes/displays adequate comfort level or baseline comfort level  Outcome: Progressing     Problem: Safety - Adult  Goal: Free from fall injury  Outcome: Progressing     Problem: Discharge Planning  Goal: Discharge to home or other facility with appropriate resources  Outcome: Progressing     Problem: Chronic Conditions and Co-morbidities  Goal: Patient's chronic conditions and co-morbidity symptoms are monitored and maintained or improved  Outcome: Progressing     Problem: Nutrition  Goal: Nutrient intake appropriate for maintaining nutritional needs  Outcome: Progressing     Problem: Fall/Injury  Goal: Not fall by end of shift  Outcome: Progressing  Goal: Be free from injury by end of the shift  Outcome: Progressing  Goal: Verbalize understanding of personal risk factors for fall in the hospital  Outcome: Progressing  Goal: Verbalize understanding of risk factor reduction measures to prevent injury from fall in the home  Outcome: Progressing  Goal: Use assistive devices by end of the shift  Outcome: Progressing  Goal: Pace activities to prevent fatigue by end of the shift  Outcome: Progressing     Problem: Pain  Goal: Takes deep breaths with improved pain control throughout the shift  Outcome: Progressing  Goal: Turns in bed with improved pain control throughout the shift  Outcome: Progressing  Goal: Walks with improved pain control throughout the shift  Outcome: Progressing  Goal: Performs ADL's with improved pain control throughout shift  Outcome: Progressing  Goal: Participates in PT with improved pain control throughout the shift  Outcome: Progressing  Goal: Free from opioid side effects throughout the shift  Outcome: Progressing  Goal: Free from acute confusion  related to pain meds throughout the shift  Outcome: Progressing

## 2025-04-07 NOTE — PROGRESS NOTES
Physical Therapy    Physical Therapy Evaluation    Patient Name: Maribeth Nam  MRN: [id number]  Today's Date: 4/7/2025   Time Calculation  Start Time: 1630  Stop Time: 1703  Time Calculation (min): 33 min  2329/2329-A    Assessment/Plan   PT Assessment  PT Assessment Results: Decreased strength, Decreased endurance, Impaired balance  Rehab Prognosis: Good  Barriers to Discharge Home: Physical needs  Physical Needs: Intermittent mobility assistance needed, Intermittent ADL assistance needed, High falls risk due to function or environment  Evaluation/Treatment Tolerance: Patient tolerated treatment well  Medical Staff Made Aware: Yes  End of Session Communication: Bedside nurse, Care Coordinator    Assessment Comment: Patient presents with deficits in R LE strength with demonstration of R knee edema, decreased endurance, balance, reported chronic knee buckling, and 7/10 R knee pain; 10/10 back pain. Tremoring is noted during stance phase of gait R LE though (-) clonus. She requires MIN  A to recover from posterior LOB when closing eyes and CGA for gait with WW. Recommend continued PT with referral for HIGH INTENSITY rehab.    End of Session Patient Position: Up in chair, Alarm on  IP OR SWING BED PT PLAN  Inpatient or Swing Bed: Inpatient     04/07/25 1630   PT Plan   Treatment/Interventions Bed mobility;Transfer training;Gait training;Balance training;Strengthening;Endurance training;Therapeutic exercise;Therapeutic activity;Home exercise program   PT Plan Ongoing PT   PT Frequency Daily   PT Discharge Recommendations High intensity level of continued care   Equipment Recommended upon Discharge Wheeled walker  (Pt has a rollator at home but no WW. May benefit from one upon dc.)   PT Recommended Transfer Status Assist x1   PT - OK to Discharge Yes  (To next level of care when medically cleared.)       Subjective     Current Problem:  1. Cerebrovascular accident (CVA), unspecified mechanism (Multi)  Transthoracic  Echo (TTE) Limited    Transthoracic Echo (TTE) Limited      2. Other cerebrovascular disease  Transthoracic Echo (TTE) Limited    Transthoracic Echo (TTE) Limited        Patient Active Problem List   Diagnosis    Anisocoria    Bipolar disorder    Chronic pain syndrome    Chronic obstructive pulmonary disease (Multi)    Diabetes mellitus (Multi)    Gastroesophageal reflux disease    Generalized convulsive epilepsy (Multi)    History of total knee replacement    Hx of migraine headaches    Hypertension    Irritable bowel syndrome    Migraine with aura    Onychomycosis    Seizure (Multi)    Restless legs    Cerebrovascular accident (CVA), unspecified mechanism (Multi)       General Visit Information:  General  Reason for Referral: CVA. Impaired mobility.  Referred By: Ignacio Leary MD  Past Medical History Relevant to Rehab: 50 y/o female hospitalized for CVA (L facial droop and paresthesia, R weakness s/p TNK 4/6. Pt reports new onset stutter. Recently seen 3/2025 with L face/UE/LE tingling. 1/2025 similar with L weakness and visual flashing lights deemed migraine aura by neurology. (PMHx:COPD, T2DM, migraines, epilepsy, RLS)  Family/Caregiver Present: No  Prior to Session Communication: Bedside nurse  Patient Position Received: Bed, 2 rail up, Alarm off, not on at start of session  General Comment: Patient alert, agreeable to participate in PT.    Home Living:  Home Living  Type of Home: Apartment  Lives With:  (Boyfriend lives on 4th floor; pt lives on 2nd floor; 4 year old son together.)  Home Adaptive Equipment:  (Rollator)  Home Layout: One level  Home Access: Elevator  Bathroom Shower/Tub: Tub/shower unit (Reports unable to lift legs in/out due to R sciatic pain. No grab bar available.)  Bathroom Equipment: Shower chair with back (Grab bar is more like a washcloth marleni per pt.)  Home Living Comments: Spends time between her apt and boyfriend's apt.    Prior Level of Function:  Prior Function Per Pt/Caregiver  Report  Level of [place]: Independent with ADLs and functional transfers, Independent with homemaking with ambulation (Cares for 4 year old son)  Receives Help From:  (Boyfriend - does not work; on disability.)  ADL Assistance: Independent  Homemaking Assistance: Independent  Ambulatory Assistance: Independent (With rollator)  Vocational: On disability  Prior Function Comments: + 8-9 falls in past 6 months with R knee buckling. Uses [employer] transportation as a last resort - for grocery shopping (uses scooter).    Precautions:  Precautions  Precautions Comment: Fall precautions. R LE tremor (pt reports chronic R knee buckling.    Vital Signs:  Vital Signs  Vitals Session: During PT  Heart Rate: 83  SpO2: 97 %  Objective     Pain:  Pain Assessment  Pain Assessment: 0-10  0-10 (Numeric) Pain Score: 10 - Worst possible pain  Pain Type: Acute pain, Chronic pain  Pain Location: Back  Pain Orientation: Lower (Also notes R knee and headache at 7/10. R knee edema noted. Pt reports follow up with Dr. Payan for R knee within  the next month.)  Pain Interventions: Cold applied, Ambulation/increased activity    Cognition:  Cognition  Orientation Level: Oriented X4  Safety/Judgement: Exceptions to WFL  Insight: Mild    General Assessments:  General Observation  General Observation: Patient educated in potential benefit of WW vs rollator use to increase stability and reduce fall risk (when compared with rollator use). Also instructed in benefit of reacher use to reduce pain and increased weakness R LE from chronic bulging disc / sciatica when picking up objects from the floor. Recommended turning on a light when up at night or in dim lighting and UE support in the shower when washing face/hair (closing eyes)with balance deficict noted today. Encouraged pt to request assistance for all transfers and ambulation at this time.   Activity Tolerance  Endurance: Decreased tolerance for upright activites  Rate of Perceived Exertion  (RPE): Modified RADHA RPE: 4/10 with ambulation.  Sensation  Light Touch:  (Tingling R LE, no difference in sensation when compared with L LE.)  Strength  Strength Comments: Hip flexion (B 2/5 with pain), quads (L 4/5, R 3+/5 with pain), hamstrings (L 4/5, R 4-/5), df (L 4/5, R 3+/5), EHL R 3/5.  Perception  Inattention/Neglect: Appears intact  Coordination  Movements are Fluid and Coordinated: Yes     Static Sitting Balance  Static Sitting-Balance Support: Feet supported, No upper extremity supported  Static Sitting-Level of Assistance: Distant supervision  Dynamic Sitting Balance  Dynamic Sitting-Comments: Reports boyfriend dons her socks and shoes for her.  Static Standing Balance  Static Standing-Balance Support: No upper extremity supported  Static Standing-Level of Assistance: Contact guard  Static Standing-Comment/Number of Minutes: Eyes closed LOB posteriorly requiring MIn A to recover x 2 in 10 seconds. Reaches within STEPAHNIE with 1 UE support and CGA, no LOB.    Functional Assessments:     Bed Mobility  Bed Mobility: No (Pt already sitting EOB.)  Transfers  Transfer:  (Sit <> stand CGA.)  Ambulation/Gait Training  Ambulation/Gait Training Performed:  (Ambulated 50 feet with WW and CGA with step to gait, tremoring of R LE during stance phase. (-clonus when tested after in a seated position))  Stairs  Stairs: No (Not needed for home set up.)       Extremity/Trunk Assessments:        RLE   RLE : Within Functional Limits  LLE   LLE : Within Functional Limits    Outcome Measures:     [facility] Basic Mobility  Turning from your back to your side while in a flat bed without using bedrails: Total (Pt sitting EOB upon PT arrival, desired to sit up in chair.)  Moving from lying on your back to sitting on the side of a flat bed without using bedrails: Total (See above note.)  Moving to and from bed to chair (including a wheelchair): A little  Standing up from a chair using your arms (e.g. wheelchair or bedside chair): A  little  To walk in hospital room: A little  Climbing 3-5 steps with railing: Total  Basic Mobility - Total Score: 12                                                             Goals:  Encounter Problems       Encounter Problems (Active)       To improve strength, balance, pain, and overall mobility:        Patient will participate in core and R LE exercises x 10 repetitions and postural education (avoidance of flexion, trial of lumbar support) to reduce back pain to < 7/10.       Start:  04/07/25    Expected End:  04/21/25            Patient will complete bed mobility (using log roll to reduce back and LE pain) and transfers with SBA.       Start:  04/07/25    Expected End:  04/21/25            Patient will ambulate 75 feet x 2 with WW with continuous gait > 50% time with SBA.       Start:  04/07/25    Expected End:  04/21/25                 Education Documentation  Mobility Training, taught by Teodora Moran PT at 4/7/2025  5:51 PM.  Learner: Patient  Readiness: Acceptance  Method: Explanation  Response: Verbalizes Understanding    Education Comments  No comments found.

## 2025-04-08 ENCOUNTER — TELEPHONE (OUTPATIENT)
Dept: HOME HEALTH SERVICES | Facility: HOME HEALTH | Age: 50
End: 2025-04-08

## 2025-04-08 ENCOUNTER — PHARMACY VISIT (OUTPATIENT)
Dept: PHARMACY | Facility: CLINIC | Age: 50
End: 2025-04-08
Payer: COMMERCIAL

## 2025-04-08 ENCOUNTER — APPOINTMENT (OUTPATIENT)
Dept: CARDIOLOGY | Facility: HOSPITAL | Age: 50
DRG: 062 | End: 2025-04-08
Payer: COMMERCIAL

## 2025-04-08 VITALS
TEMPERATURE: 96.4 F | OXYGEN SATURATION: 96 % | HEART RATE: 73 BPM | BODY MASS INDEX: 44.79 KG/M2 | SYSTOLIC BLOOD PRESSURE: 134 MMHG | HEIGHT: 64 IN | DIASTOLIC BLOOD PRESSURE: 91 MMHG | WEIGHT: 262.35 LBS | RESPIRATION RATE: 20 BRPM

## 2025-04-08 LAB
ANION GAP SERPL CALC-SCNC: 9 MMOL/L (ref 10–20)
BASOPHILS # BLD AUTO: 0.07 X10*3/UL (ref 0–0.1)
BASOPHILS NFR BLD AUTO: 0.8 %
BODY SURFACE AREA: 2.32 M2
BUN SERPL-MCNC: 14 MG/DL (ref 6–23)
CALCIUM SERPL-MCNC: 9.8 MG/DL (ref 8.6–10.3)
CHLORIDE SERPL-SCNC: 105 MMOL/L (ref 98–107)
CO2 SERPL-SCNC: 31 MMOL/L (ref 21–32)
CREAT SERPL-MCNC: 0.87 MG/DL (ref 0.5–1.05)
EGFRCR SERPLBLD CKD-EPI 2021: 81 ML/MIN/1.73M*2
EJECTION FRACTION: 63 %
EOSINOPHIL # BLD AUTO: 0.32 X10*3/UL (ref 0–0.7)
EOSINOPHIL NFR BLD AUTO: 3.6 %
ERYTHROCYTE [DISTWIDTH] IN BLOOD BY AUTOMATED COUNT: 14.5 % (ref 11.5–14.5)
GLUCOSE BLD MANUAL STRIP-MCNC: 147 MG/DL (ref 74–99)
GLUCOSE BLD MANUAL STRIP-MCNC: 184 MG/DL (ref 74–99)
GLUCOSE SERPL-MCNC: 124 MG/DL (ref 74–99)
HCT VFR BLD AUTO: 44.1 % (ref 36–46)
HGB BLD-MCNC: 14.2 G/DL (ref 12–16)
IMM GRANULOCYTES # BLD AUTO: 0.04 X10*3/UL (ref 0–0.7)
IMM GRANULOCYTES NFR BLD AUTO: 0.4 % (ref 0–0.9)
LYMPHOCYTES # BLD AUTO: 1.81 X10*3/UL (ref 1.2–4.8)
LYMPHOCYTES NFR BLD AUTO: 20.1 %
MAGNESIUM SERPL-MCNC: 2.04 MG/DL (ref 1.6–2.4)
MCH RBC QN AUTO: 27.7 PG (ref 26–34)
MCHC RBC AUTO-ENTMCNC: 32.2 G/DL (ref 32–36)
MCV RBC AUTO: 86 FL (ref 80–100)
MONOCYTES # BLD AUTO: 0.87 X10*3/UL (ref 0.1–1)
MONOCYTES NFR BLD AUTO: 9.7 %
NEUTROPHILS # BLD AUTO: 5.9 X10*3/UL (ref 1.2–7.7)
NEUTROPHILS NFR BLD AUTO: 65.4 %
NRBC BLD-RTO: 0 /100 WBCS (ref 0–0)
PLATELET # BLD AUTO: 251 X10*3/UL (ref 150–450)
POTASSIUM SERPL-SCNC: 4.6 MMOL/L (ref 3.5–5.3)
RBC # BLD AUTO: 5.13 X10*6/UL (ref 4–5.2)
SODIUM SERPL-SCNC: 140 MMOL/L (ref 136–145)
WBC # BLD AUTO: 9 X10*3/UL (ref 4.4–11.3)

## 2025-04-08 PROCEDURE — 82947 ASSAY GLUCOSE BLOOD QUANT: CPT

## 2025-04-08 PROCEDURE — 94640 AIRWAY INHALATION TREATMENT: CPT

## 2025-04-08 PROCEDURE — 93246 EXT ECG>7D<15D RECORDING: CPT

## 2025-04-08 PROCEDURE — 2500000002 HC RX 250 W HCPCS SELF ADMINISTERED DRUGS (ALT 637 FOR MEDICARE OP, ALT 636 FOR OP/ED): Performed by: INTERNAL MEDICINE

## 2025-04-08 PROCEDURE — 97530 THERAPEUTIC ACTIVITIES: CPT | Mod: GP,CQ

## 2025-04-08 PROCEDURE — RXMED WILLOW AMBULATORY MEDICATION CHARGE

## 2025-04-08 PROCEDURE — 2500000001 HC RX 250 WO HCPCS SELF ADMINISTERED DRUGS (ALT 637 FOR MEDICARE OP): Performed by: INTERNAL MEDICINE

## 2025-04-08 PROCEDURE — 80048 BASIC METABOLIC PNL TOTAL CA: CPT | Performed by: INTERNAL MEDICINE

## 2025-04-08 PROCEDURE — 97116 GAIT TRAINING THERAPY: CPT | Mod: GP,CQ

## 2025-04-08 PROCEDURE — 85025 COMPLETE CBC W/AUTO DIFF WBC: CPT | Performed by: INTERNAL MEDICINE

## 2025-04-08 PROCEDURE — 36415 COLL VENOUS BLD VENIPUNCTURE: CPT | Performed by: INTERNAL MEDICINE

## 2025-04-08 PROCEDURE — 83735 ASSAY OF MAGNESIUM: CPT | Performed by: INTERNAL MEDICINE

## 2025-04-08 PROCEDURE — 76937 US GUIDE VASCULAR ACCESS: CPT

## 2025-04-08 PROCEDURE — 99239 HOSP IP/OBS DSCHRG MGMT >30: CPT | Performed by: INTERNAL MEDICINE

## 2025-04-08 PROCEDURE — 99233 SBSQ HOSP IP/OBS HIGH 50: CPT | Performed by: PSYCHIATRY & NEUROLOGY

## 2025-04-08 RX ORDER — CLOPIDOGREL BISULFATE 75 MG/1
75 TABLET ORAL DAILY
Qty: 30 TABLET | Refills: 0 | Status: SHIPPED | OUTPATIENT
Start: 2025-04-08 | End: 2025-05-08

## 2025-04-08 RX ORDER — IPRATROPIUM BROMIDE AND ALBUTEROL SULFATE 2.5; .5 MG/3ML; MG/3ML
3 SOLUTION RESPIRATORY (INHALATION) 4 TIMES DAILY
Status: DISCONTINUED | OUTPATIENT
Start: 2025-04-08 | End: 2025-04-08 | Stop reason: HOSPADM

## 2025-04-08 RX ORDER — IPRATROPIUM BROMIDE AND ALBUTEROL SULFATE 2.5; .5 MG/3ML; MG/3ML
3 SOLUTION RESPIRATORY (INHALATION)
Status: DISCONTINUED | OUTPATIENT
Start: 2025-04-08 | End: 2025-04-08

## 2025-04-08 RX ORDER — CLOPIDOGREL BISULFATE 75 MG/1
75 TABLET ORAL DAILY
Status: DISCONTINUED | OUTPATIENT
Start: 2025-04-08 | End: 2025-04-08 | Stop reason: HOSPADM

## 2025-04-08 RX ADMIN — INSULIN LISPRO 2 UNITS: 100 INJECTION, SOLUTION INTRAVENOUS; SUBCUTANEOUS at 11:37

## 2025-04-08 RX ADMIN — CLOPIDOGREL 75 MG: 75 TABLET ORAL at 11:36

## 2025-04-08 RX ADMIN — HYDROXYZINE HYDROCHLORIDE 50 MG: 25 TABLET, FILM COATED ORAL at 09:45

## 2025-04-08 RX ADMIN — FLUTICASONE FUROATE AND VILANTEROL TRIFENATATE 1 PUFF: 200; 25 POWDER RESPIRATORY (INHALATION) at 09:45

## 2025-04-08 RX ADMIN — ALPRAZOLAM 2 MG: 0.5 TABLET ORAL at 11:36

## 2025-04-08 RX ADMIN — GABAPENTIN 800 MG: 400 CAPSULE ORAL at 09:45

## 2025-04-08 RX ADMIN — IPRATROPIUM BROMIDE AND ALBUTEROL SULFATE 3 ML: 2.5; .5 SOLUTION RESPIRATORY (INHALATION) at 11:23

## 2025-04-08 RX ADMIN — TOPIRAMATE 75 MG: 50 TABLET ORAL at 09:45

## 2025-04-08 ASSESSMENT — COGNITIVE AND FUNCTIONAL STATUS - GENERAL
MOVING TO AND FROM BED TO CHAIR: A LITTLE
MOBILITY SCORE: 17
TOILETING: A LOT
DAILY ACTIVITIY SCORE: 18
TURNING FROM BACK TO SIDE WHILE IN FLAT BAD: A LITTLE
PERSONAL GROOMING: A LITTLE
DRESSING REGULAR UPPER BODY CLOTHING: A LITTLE
TURNING FROM BACK TO SIDE WHILE IN FLAT BAD: A LITTLE
MOVING FROM LYING ON BACK TO SITTING ON SIDE OF FLAT BED WITH BEDRAILS: A LITTLE
STANDING UP FROM CHAIR USING ARMS: A LITTLE
DRESSING REGULAR LOWER BODY CLOTHING: A LITTLE
WALKING IN HOSPITAL ROOM: A LOT
MOBILITY SCORE: 15
HELP NEEDED FOR BATHING: A LITTLE
MOVING TO AND FROM BED TO CHAIR: A LITTLE
CLIMB 3 TO 5 STEPS WITH RAILING: A LOT
WALKING IN HOSPITAL ROOM: A LOT
STANDING UP FROM CHAIR USING ARMS: A LITTLE
CLIMB 3 TO 5 STEPS WITH RAILING: TOTAL

## 2025-04-08 ASSESSMENT — PAIN SCALES - WONG BAKER: WONGBAKER_NUMERICALRESPONSE: NO HURT

## 2025-04-08 ASSESSMENT — PAIN SCALES - GENERAL
PAINLEVEL_OUTOF10: 0 - NO PAIN

## 2025-04-08 ASSESSMENT — PAIN - FUNCTIONAL ASSESSMENT: PAIN_FUNCTIONAL_ASSESSMENT: 0-10

## 2025-04-08 NOTE — TELEPHONE ENCOUNTER
This referral has been made a Non Admit with  Home Care due to Patient's Home is Outside of [facility] Service Area. If you have further questions, feel free to reach out to our office at [phone]. Thank you, [facility] Intake.

## 2025-04-08 NOTE — DISCHARGE SUMMARY
Discharge Diagnosis  Cerebrovascular accident (CVA), unspecified mechanism (Multi)  Right knee effusion  Complicated migraine    Issues Requiring Follow-Up  Follow-up with your primary care provider and orthopedic surgeon as outpatient    Discharge Meds     Medication List      START taking these medications     clopidogrel 75 mg tablet; Commonly known as: Plavix; Take 1 tablet (75   mg) by mouth once daily.     CONTINUE taking these medications     Advair -21 mcg/actuation inhaler; Generic drug: fluticasone   propion-salmeteroL   * albuterol 90 mcg/actuation inhaler   * albuterol 2.5 mg /3 mL (0.083 %) nebulizer solution   ALPRAZolam 1 mg tablet; Commonly known as: Xanax   atorvastatin 40 mg tablet; Commonly known as: Lipitor   famotidine 40 mg tablet; Commonly known as: Pepcid   gabapentin 800 mg tablet; Commonly known as: Neurontin   hydrOXYzine pamoate 50 mg capsule; Commonly known as: Vistaril   ipratropium-albuteroL 0.5-2.5 mg/3 mL nebulizer solution; Commonly known   as: Duo-Neb   mirtazapine 15 mg tablet; Commonly known as: Remeron   Nucala 100 mg/mL auto-injector; Generic drug: mepolizumab   Ozempic 2 mg/dose (8 mg/3 mL) pen injector; Generic drug: semaglutide   pantoprazole 40 mg EC tablet; Commonly known as: ProtoNix   promethazine 25 mg tablet; Commonly known as: Phenergan   rOPINIRole 4 mg tablet; Commonly known as: Requip   temazepam 30 mg capsule; Commonly known as: Restoril   topiramate 50 mg tablet; Commonly known as: Topamax   traZODone 100 mg tablet; Commonly known as: Desyrel  * This list has 2 medication(s) that are the same as other medications   prescribed for you. Read the directions carefully, and ask your doctor or   other care provider to review them with you.       Test Results Pending At Discharge  Pending Labs       No current pending labs.            Hospital Course  Maribeth Nam is a 49 y.o. female with past medical history of COPD, T2DM, migraines, epilepsy, RLS, obesity  presenting with left facial droop and right-sided weakness and was given TNK and admitted to ICU for close observation.  Patient had MRI at 24-hour interval which did not show any acute pathology.  At this point neurology recommended lumbar MRI which also showed no acute pathology either.  Her right upper extremity weakness is improving significantly.  She attributes her lower extremity weakness secondary to right knee effusion for which she is following up with orthopedic and will have drain done.  She will be discharged on Plavix for secondary prevention.  She likely had complicated migraine versus functional weakness causing her symptoms.  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  That will be ordered for her on discharge.    48 minutes spent in discharge timing        Pertinent Physical Exam At Time of Discharge  General: Not in acute distress, alert  HEENT: PERRLA, head intact and normocephalic  Neck: Normal to inspection  Lungs: Clear to auscultation, work of breathing within normal limit  Cardiac: Regular rate and rhythm  Abdomen: Soft nontender, positive bowel sounds  : Exam deferred  Skin: Intact  Hematology: No petechia or excessive ecchymosis  Musculoskeletal: Without significant trauma  Neurological: Alert awake oriented, right upper extremity weakness has improved.  Still continues to have weakness of right lower extremity but has right knee effusion that is slightly tender but not warm.  Psych: No suicidal ideation or homicidal ideation    Outpatient Follow-Up  No future appointments.      Ignacio Leary MD

## 2025-04-08 NOTE — PROGRESS NOTES
"Patient refusing cardiac monitoring. Education provided for importance of cardiac monitoring and patient continued to refuse. Patient claimed she \"didn't see the need for it\". Notified via secure chat NP for night coverage.KERVIN LLOYD RN    "

## 2025-04-08 NOTE — PROGRESS NOTES
"PROGRESS NOTE    Patient seen this morning. No family members around.    Just about to eat. States LE paresthesias seem improving. Has R knee pain. No stuttering. HA ongoing. On topiramate 75mg bid tolerating well. No sz like activity. No facial droop evident. Clopidogrel just started this morning. I see no UE tremors the whole time I was there--when I asked her about the tremors towards the end of the visit she said she was \"sitting\" on her hands (at that time, yes; but earlier hands were on the table with no tremors).    MRI l-spine with no significant structural explanation for LE symptoms.      Allergies  Doxycycline, Fish containing products, Ibuprofen, Meperidine, Sulfa (sulfonamide antibiotics), Aspirin, and Phenothiazines    Current Meds  Scheduled medications  atorvastatin, 40 mg, oral, Nightly  clopidogrel, 75 mg, oral, Daily  famotidine, 40 mg, oral, q PM  fluticasone furoate-vilanteroL, 1 puff, inhalation, Daily  gabapentin, 800 mg, oral, TID  hydrOXYzine HCL, 50 mg, oral, TID  insulin lispro, 0-10 Units, subcutaneous, Before meals & nightly  ipratropium-albuteroL, 3 mL, nebulization, 4x daily  perflutren protein A microsphere, 0.5 mL, intravenous, Once in imaging  rOPINIRole, 4 mg, oral, Nightly  topiramate, 75 mg, oral, BID  traZODone, 100 mg, oral, Nightly      Continuous medications     PRN medications  PRN medications: acetaminophen, ALPRAZolam, butalbital-acetaminophen-caff, dextrose, dextrose, glucagon, glucagon, hydrALAZINE, ipratropium-albuteroL, labetaloL, traMADol    Last Recorded Vitals  Blood pressure (!) 134/91, pulse 73, temperature 35.8 °C (96.4 °F), resp. rate 20, height 1.626 m (5' 4\"), weight 119 kg (262 lb 5.6 oz), SpO2 97%.    Neuro Exam    Awake, alert, oriented, in no distress  Well-nourished, in bed      Mental status exam as above, conversant   Full EOMs intact, no nystagmus, no ptosis   No facial droop   Hearing grossly intact   No dysarthria     NO tremors BUE today     I did " not have her stand or walk       Relevant Results  I have personally reviewed the following:    Labs  Results for orders placed or performed during the hospital encounter of 04/06/25 (from the past 24 hours)   POCT GLUCOSE   Result Value Ref Range    POCT Glucose 198 (H) 74 - 99 mg/dL   POCT GLUCOSE   Result Value Ref Range    POCT Glucose 142 (H) 74 - 99 mg/dL   POCT GLUCOSE   Result Value Ref Range    POCT Glucose 130 (H) 74 - 99 mg/dL   Basic Metabolic Panel   Result Value Ref Range    Glucose 124 (H) 74 - 99 mg/dL    Sodium 140 136 - 145 mmol/L    Potassium 4.6 3.5 - 5.3 mmol/L    Chloride 105 98 - 107 mmol/L    Bicarbonate 31 21 - 32 mmol/L    Anion Gap 9 (L) 10 - 20 mmol/L    Urea Nitrogen 14 6 - 23 mg/dL    Creatinine 0.87 0.50 - 1.05 mg/dL    eGFR 81 >60 mL/min/1.73m*2    Calcium 9.8 8.6 - 10.3 mg/dL   CBC and Auto Differential   Result Value Ref Range    WBC 9.0 4.4 - 11.3 x10*3/uL    nRBC 0.0 0.0 - 0.0 /100 WBCs    RBC 5.13 4.00 - 5.20 x10*6/uL    Hemoglobin 14.2 12.0 - 16.0 g/dL    Hematocrit 44.1 36.0 - 46.0 %    MCV 86 80 - 100 fL    MCH 27.7 26.0 - 34.0 pg    MCHC 32.2 32.0 - 36.0 g/dL    RDW 14.5 11.5 - 14.5 %    Platelets 251 150 - 450 x10*3/uL    Neutrophils % 65.4 40.0 - 80.0 %    Immature Granulocytes %, Automated 0.4 0.0 - 0.9 %    Lymphocytes % 20.1 13.0 - 44.0 %    Monocytes % 9.7 2.0 - 10.0 %    Eosinophils % 3.6 0.0 - 6.0 %    Basophils % 0.8 0.0 - 2.0 %    Neutrophils Absolute 5.90 1.20 - 7.70 x10*3/uL    Immature Granulocytes Absolute, Automated 0.04 0.00 - 0.70 x10*3/uL    Lymphocytes Absolute 1.81 1.20 - 4.80 x10*3/uL    Monocytes Absolute 0.87 0.10 - 1.00 x10*3/uL    Eosinophils Absolute 0.32 0.00 - 0.70 x10*3/uL    Basophils Absolute 0.07 0.00 - 0.10 x10*3/uL   Magnesium   Result Value Ref Range    Magnesium 2.04 1.60 - 2.40 mg/dL   POCT GLUCOSE   Result Value Ref Range    POCT Glucose 147 (H) 74 - 99 mg/dL       === 04/06/25 ===    MR LUMBAR SPINE WO CONTRAST    - Impression -  1.  There are mild degenerative changes of the lower lumbar spine  including mild bilateral neural foraminal narrowing at L5-S1.    MACRO:  None    Signed by: Andre Pak 4/8/2025 8:28 AM  Dictation workstation:   [id number]       Assessment/Plan    1.  Patient reported left facial paresthesias and left facial droop and RUE/RLE weakness, 4/6/25  2.  Status post IV TNK 4/6/25  On my history, patient tells me she had new onset left facial paresthesia/left facial droop, some minor right facial paresthesias that she did not report, RLE tingling that she did not report, and LLE numbness  Has aspirin ALLERGY (hives)  Per outside records, patient was advised to take clopidogrel 1/2025, but discharge summary appears to indicate symptoms were thought to be migraine-related, there was no mention of clopidogrel.  Patient is not taking clopidogrel currently.  Patient on atorvastatin  Ddx: stroke aborted by thrombolysis, recurrent TIA, ? Complicated migraine, vs other (including poss FND)  Clopidogrel started today     3.  Chronic migraines, as stated  Also smoking  S/p depakote (for epilepsy)  S/p Nurtec--doesn't help  On topiramate 50mg bid x2 months  Increased to topiramate 75mg bid this admit     4.   Epilepsy history, as stated  Last reported sz 2024  On topiramate 50mg bid x2 months--increased to topiramate 75mg bid this admit  S/p phenobarbital  S/p depakote  S/p Keppra  S/p dilantin in past     5.   Tremors  Mostly action tremors--started in teenage years  States has never been diagnosed or treated  ?? Essential tremors  Has asthma--WILL NOT RECOMMEND PROPRANOLOL  Already on topiramate  Outpatient--? Consider primidone if wanting     6.   Abnormal brain MRI  Nonspecific findings--?? IIH--if present, may explain some of her HA  Discussed this again today with pt--strongly advised outpatient ophthalmology evaluation  Continue topiramate for now    7.   Concern for FND elsewhere (functional neurologic disorder)  Discussed  with pt today. Pt states she has no idea about this. Pt denies stress. Does have anxiety but states controlled. Discussed, may or may not necessarily have mood issues or stressors to have FND. She has other outstanding uncontrolled neuro issues at this time that we need to work on.     8.  Polypharmacy     9.  Tobacco use        Plans:  Start clopidogrel 75mg daily (has allergy to ASA--hives)  Continue high intensity statin  Consider outpatient holter monitor  Allow permissive hypertension for 48-72 hours after stroke onset   5.   Control vascular risk factors   6.   Outpatient ophthalmology evaluation/management for possible IIH   7.   PT eval   8.   Advised again, quit smoking   9.   Continue topiramate 75mg bid (for migraines/stated epilepsy) to outpatient   10. Continue PRN fioricet. Can likely discharge with temporary fill of 5 tablets but discussed with patient that she will need to follow up with her care team for continued dosing as this is controlled substance, states understanding    11. Defer tremor treatment to outpatient, this is chronic issue. ?? Primidone.   12.  Continue supportive care    No other inpatient work-up from neuro standpoint.     Follow-up with neurology outpatient.    Discussed with patient. Message sent to Dr Leary. All questions answered. Please call with questions.    Iraj Zimmerman MD  4/8/2025  10:36 AM

## 2025-04-08 NOTE — CARE PLAN
The patient's goals for the shift include to do neuro checks    The clinical goals for the shift include patient will have no change in neuro status throughout shift    Over the shift, the patient did not make progress toward the following goals. Barriers to progression include willingness to comply. Recommendations to address these barriers include education.    Problem: Fall/Injury  Goal: Not fall by end of shift  Outcome: Progressing  Goal: Be free from injury by end of the shift  Outcome: Progressing  Goal: Verbalize understanding of personal risk factors for fall in the hospital  Outcome: Progressing  Goal: Verbalize understanding of risk factor reduction measures to prevent injury from fall in the home  Outcome: Progressing  Goal: Use assistive devices by end of the shift  Outcome: Progressing  Goal: Pace activities to prevent fatigue by end of the shift  Outcome: Progressing     Problem: Nutrition  Goal: Nutrient intake appropriate for maintaining nutritional needs  Outcome: Progressing

## 2025-04-08 NOTE — PROGRESS NOTES
Physical Therapy    Physical Therapy Treatment    Patient Name: Maribeth Nam  MRN: [id number]  Department: [place] [id number]  Room: [address] [place], [place] [address]  Today's Date: 4/8/2025  Time Calculation  Start Time: 0948  Stop Time: 1011  Time Calculation (min): 23 min         Assessment/Plan   PT Assessment  PT Assessment Results: Decreased strength, Decreased endurance  Rehab Prognosis: Good  Barriers to Discharge Home: Caregiver assistance, Physical needs  End of Session Communication: Bedside nurse, PCT/NA/CTA  Assessment Comment: pt on 4L 02 and only uses 02 for night time. pt took of 02 for amb 02 remained above 91% Sp02. pt ptamb with a very slow pace and step to gait. cues pt to take larger steps. increased time for amb. cues on hand placement with transfers. notifed RN of pt's status and PCA.  End of Session Patient Position: Up in chair, Alarm on     PT Plan  Treatment/Interventions: Bed mobility, Transfer training, Gait training, Balance training, Strengthening, Endurance training, Therapeutic exercise, Therapeutic activity, Home exercise program  PT Plan: Ongoing PT  PT Frequency: Daily  PT Discharge Recommendations: High intensity level of continued care  Equipment Recommended upon Discharge: Wheeled walker (Pt has a rollator at home but no WW. May benefit from one upon dc.)  PT Recommended Transfer Status: Assist x1  PT - OK to Discharge: Yes (To next level of care when medically cleared.)      General Visit Information:   PT  Visit  PT Received On: 04/08/25       Subjective   Precautions:        Date/Time Vitals Session Patient Position Pulse Resp SpO2 BP MAP (mmHg)    04/08/25 1123 --  --  --  --  96 %  --  --                 Objective   Pain:  Pain Assessment  0-10 (Numeric) Pain Score: 0 - No pain  Cognition:  Cognition  Orientation Level: Oriented X4  Coordination:          Treatments:       Bed Mobility 1  Bed Mobility 1: Supine to sitting  Level of Assistance 1: Contact guard    Ambulation/Gait  Training  Ambulation/Gait Training Performed: Yes  Ambulation/Gait Training 1  Surface 1: Level tile  Device 1: Rolling walker  Assistance 1: Minimum assistance  Quality of Gait 1: Diminished heel strike, Decreased step length, Inconsistent stride length, Shuffling gait  Comments/Distance (ft) 1: 50  Transfers  Transfer: Yes  Transfer 1  Technique 1: Sit to stand, Stand to sit  Transfer Device 1: Walker  Transfer Level of Assistance 1: Contact guard    Outcome Measures:  [facility] Basic Mobility  Turning from your back to your side while in a flat bed without using bedrails: A little  Moving from lying on your back to sitting on the side of a flat bed without using bedrails: A little  Moving to and from bed to chair (including a wheelchair): A little  Standing up from a chair using your arms (e.g. wheelchair or bedside chair): A little  To walk in hospital room: A lot  Climbing 3-5 steps with railing: Total  Basic Mobility - Total Score: 15    Education Documentation  Mobility Training, taught by Jaci Sosa PTA at 4/8/2025  1:20 PM.  Learner: Patient  Readiness: Acceptance  Method: Explanation  Response: Verbalizes Understanding    Education Comments  No comments found.        OP EDUCATION:       Encounter Problems       Encounter Problems (Active)       To improve strength, balance, pain, and overall mobility:        Patient will participate in core and R LE exercises x 10 repetitions and postural education (avoidance of flexion, trial of lumbar support) to reduce back pain to < 7/10. (Progressing)       Start:  04/07/25    Expected End:  04/21/25            Patient will complete bed mobility (using log roll to reduce back and LE pain) and transfers with SBA. (Progressing)       Start:  04/07/25    Expected End:  04/21/25            Patient will ambulate 75 feet x 2 with WW with continuous gait > 50% time with SBA. (Progressing)       Start:  04/07/25    Expected End:  04/21/25                  Encounter  Problems (Resolved)       Pain - Adult

## 2025-04-08 NOTE — CARE PLAN
The patient's goals for the shift include to do neuro checks    The clinical goals for the shift include patient will have no change in neuro status throughout shift    Over the shift, the patient did make progress toward the following goals.

## 2025-04-08 NOTE — HOSPITAL COURSE
Maribeth Nam is a 49 y.o. female with past medical history of COPD, T2DM, migraines, epilepsy, RLS, obesity presenting with left facial droop and right-sided weakness and was given TNK and admitted to ICU for close observation.  Patient had MRI at 24-hour interval which did not show any acute pathology.  At this point neurology recommended lumbar MRI which also showed no acute pathology either.  Her right upper extremity weakness is improving significantly.  She attributes her lower extremity weakness secondary to right knee effusion for which she is following up with orthopedic and will have drain done.  She will be discharged on Plavix for secondary prevention.  She likely had complicated migraine versus functional weakness causing her symptoms.  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  That will be ordered for her on discharge.    48 minutes spent in discharge timing

## 2025-04-08 NOTE — PROGRESS NOTES
Social work consult placed for Risk Screen, met with pt and/or family to assess needs and provide support, introduced self and my role as  with care transition team. Per chart review pt screened positive for multiple SDOH, SW inquired about home going concerns. Pt identified access to food as her main concern, stated she has gone to a food pantry in her area before but did not have a good experience, does travel to [place] [employer]. SW provided pt with the [facility] and the [facility] Community Resource Guide and discussed resources for food in both [facility]. Pt was appreciative and also asked about hygiene products, SW encouraged pt to contact the [facility] for assistance or additional resources. SW inquired about pt's nephew (see note from TERELL Peña RN on 4/7/25), pt shares custody with a non family member, Surekha,  does report she is potentially trying to adopt pt's nephew and this is the pt's main concern. Pt reports that Surekha does not have her nephew when she is at home and that the only time she see's him is when she takes him to school in the morning. Surekha did not identify any concerns for the child's safety at this time. Pt appreciative of SW support, No other needs identified at this time, SW signing off,  pt and care team aware of SW availability while inpt.     Rolly Suh, MSW, LSW ([id number])   Care Transitions

## 2025-04-08 NOTE — PROGRESS NOTES
Met with patient at bedside. Notified her that she is being recommended for AR or SNF placement. She is adamantly declining at this time as she needs to care for her son. She is agreeable to [facility] and would like to use [facility]. She is requesting a BSC and rollator on discharge. TCC will continue to follow for needs if they arise.     1116 Notified Kelly with [facility] of BSC and Rollator order.